# Patient Record
Sex: FEMALE | Race: WHITE | NOT HISPANIC OR LATINO | URBAN - METROPOLITAN AREA
[De-identification: names, ages, dates, MRNs, and addresses within clinical notes are randomized per-mention and may not be internally consistent; named-entity substitution may affect disease eponyms.]

---

## 2020-03-04 ENCOUNTER — EMERGENCY (EMERGENCY)
Facility: HOSPITAL | Age: 85
LOS: 1 days | Discharge: LEFT BEFORE DISCHARGE | End: 2020-03-04
Attending: EMERGENCY MEDICINE
Payer: MEDICARE

## 2020-03-04 VITALS
HEIGHT: 71 IN | OXYGEN SATURATION: 98 % | HEART RATE: 110 BPM | SYSTOLIC BLOOD PRESSURE: 128 MMHG | RESPIRATION RATE: 18 BRPM | TEMPERATURE: 97 F | DIASTOLIC BLOOD PRESSURE: 94 MMHG | WEIGHT: 179.9 LBS

## 2020-03-04 VITALS
TEMPERATURE: 98 F | OXYGEN SATURATION: 98 % | DIASTOLIC BLOOD PRESSURE: 71 MMHG | RESPIRATION RATE: 17 BRPM | HEART RATE: 89 BPM | SYSTOLIC BLOOD PRESSURE: 131 MMHG

## 2020-03-04 DIAGNOSIS — Z90.49 ACQUIRED ABSENCE OF OTHER SPECIFIED PARTS OF DIGESTIVE TRACT: Chronic | ICD-10-CM

## 2020-03-04 LAB
ALBUMIN SERPL ELPH-MCNC: 3.6 G/DL — SIGNIFICANT CHANGE UP (ref 3.3–5.2)
ALP SERPL-CCNC: 67 U/L — SIGNIFICANT CHANGE UP (ref 40–120)
ALT FLD-CCNC: 15 U/L — SIGNIFICANT CHANGE UP
ANION GAP SERPL CALC-SCNC: 14 MMOL/L — SIGNIFICANT CHANGE UP (ref 5–17)
APPEARANCE UR: CLEAR — SIGNIFICANT CHANGE UP
AST SERPL-CCNC: 22 U/L — SIGNIFICANT CHANGE UP
BASOPHILS # BLD AUTO: 0.04 K/UL — SIGNIFICANT CHANGE UP (ref 0–0.2)
BASOPHILS NFR BLD AUTO: 0.6 % — SIGNIFICANT CHANGE UP (ref 0–2)
BILIRUB SERPL-MCNC: 0.4 MG/DL — SIGNIFICANT CHANGE UP (ref 0.4–2)
BILIRUB UR-MCNC: NEGATIVE — SIGNIFICANT CHANGE UP
BUN SERPL-MCNC: 19 MG/DL — SIGNIFICANT CHANGE UP (ref 8–20)
CALCIUM SERPL-MCNC: 8.8 MG/DL — SIGNIFICANT CHANGE UP (ref 8.6–10.2)
CHLORIDE SERPL-SCNC: 97 MMOL/L — LOW (ref 98–107)
CO2 SERPL-SCNC: 28 MMOL/L — SIGNIFICANT CHANGE UP (ref 22–29)
COLOR SPEC: YELLOW — SIGNIFICANT CHANGE UP
CREAT SERPL-MCNC: 0.83 MG/DL — SIGNIFICANT CHANGE UP (ref 0.5–1.3)
DIFF PNL FLD: ABNORMAL
EOSINOPHIL # BLD AUTO: 0.12 K/UL — SIGNIFICANT CHANGE UP (ref 0–0.5)
EOSINOPHIL NFR BLD AUTO: 1.8 % — SIGNIFICANT CHANGE UP (ref 0–6)
EPI CELLS # UR: SIGNIFICANT CHANGE UP
GLUCOSE SERPL-MCNC: 93 MG/DL — SIGNIFICANT CHANGE UP (ref 70–99)
GLUCOSE UR QL: NEGATIVE MG/DL — SIGNIFICANT CHANGE UP
HCT VFR BLD CALC: 45.8 % — HIGH (ref 34.5–45)
HGB BLD-MCNC: 15.2 G/DL — SIGNIFICANT CHANGE UP (ref 11.5–15.5)
IMM GRANULOCYTES NFR BLD AUTO: 0.4 % — SIGNIFICANT CHANGE UP (ref 0–1.5)
KETONES UR-MCNC: ABNORMAL
LEUKOCYTE ESTERASE UR-ACNC: NEGATIVE — SIGNIFICANT CHANGE UP
LIDOCAIN IGE QN: 48 U/L — SIGNIFICANT CHANGE UP (ref 22–51)
LYMPHOCYTES # BLD AUTO: 0.9 K/UL — LOW (ref 1–3.3)
LYMPHOCYTES # BLD AUTO: 13.4 % — SIGNIFICANT CHANGE UP (ref 13–44)
MCHC RBC-ENTMCNC: 29.5 PG — SIGNIFICANT CHANGE UP (ref 27–34)
MCHC RBC-ENTMCNC: 33.2 GM/DL — SIGNIFICANT CHANGE UP (ref 32–36)
MCV RBC AUTO: 88.8 FL — SIGNIFICANT CHANGE UP (ref 80–100)
MONOCYTES # BLD AUTO: 0.73 K/UL — SIGNIFICANT CHANGE UP (ref 0–0.9)
MONOCYTES NFR BLD AUTO: 10.8 % — SIGNIFICANT CHANGE UP (ref 2–14)
NEUTROPHILS # BLD AUTO: 4.91 K/UL — SIGNIFICANT CHANGE UP (ref 1.8–7.4)
NEUTROPHILS NFR BLD AUTO: 73 % — SIGNIFICANT CHANGE UP (ref 43–77)
NITRITE UR-MCNC: NEGATIVE — SIGNIFICANT CHANGE UP
PH UR: 6.5 — SIGNIFICANT CHANGE UP (ref 5–8)
PLATELET # BLD AUTO: 207 K/UL — SIGNIFICANT CHANGE UP (ref 150–400)
POTASSIUM SERPL-MCNC: 3.2 MMOL/L — LOW (ref 3.5–5.3)
POTASSIUM SERPL-SCNC: 3.2 MMOL/L — LOW (ref 3.5–5.3)
PROT SERPL-MCNC: 6.1 G/DL — LOW (ref 6.6–8.7)
PROT UR-MCNC: NEGATIVE MG/DL — SIGNIFICANT CHANGE UP
RBC # BLD: 5.16 M/UL — SIGNIFICANT CHANGE UP (ref 3.8–5.2)
RBC # FLD: 13.2 % — SIGNIFICANT CHANGE UP (ref 10.3–14.5)
RBC CASTS # UR COMP ASSIST: SIGNIFICANT CHANGE UP /HPF (ref 0–4)
SODIUM SERPL-SCNC: 139 MMOL/L — SIGNIFICANT CHANGE UP (ref 135–145)
SP GR SPEC: 1.01 — SIGNIFICANT CHANGE UP (ref 1.01–1.02)
UROBILINOGEN FLD QL: NEGATIVE MG/DL — SIGNIFICANT CHANGE UP
WBC # BLD: 6.73 K/UL — SIGNIFICANT CHANGE UP (ref 3.8–10.5)
WBC # FLD AUTO: 6.73 K/UL — SIGNIFICANT CHANGE UP (ref 3.8–10.5)
WBC UR QL: NEGATIVE — SIGNIFICANT CHANGE UP

## 2020-03-04 PROCEDURE — 81001 URINALYSIS AUTO W/SCOPE: CPT

## 2020-03-04 PROCEDURE — 99284 EMERGENCY DEPT VISIT MOD MDM: CPT | Mod: 25

## 2020-03-04 PROCEDURE — 36415 COLL VENOUS BLD VENIPUNCTURE: CPT

## 2020-03-04 PROCEDURE — 99284 EMERGENCY DEPT VISIT MOD MDM: CPT

## 2020-03-04 PROCEDURE — 74177 CT ABD & PELVIS W/CONTRAST: CPT

## 2020-03-04 PROCEDURE — 83690 ASSAY OF LIPASE: CPT

## 2020-03-04 PROCEDURE — 85027 COMPLETE CBC AUTOMATED: CPT

## 2020-03-04 PROCEDURE — 80053 COMPREHEN METABOLIC PANEL: CPT

## 2020-03-04 PROCEDURE — 74177 CT ABD & PELVIS W/CONTRAST: CPT | Mod: 26

## 2020-03-04 RX ORDER — SODIUM CHLORIDE 9 MG/ML
500 INJECTION INTRAMUSCULAR; INTRAVENOUS; SUBCUTANEOUS ONCE
Refills: 0 | Status: COMPLETED | OUTPATIENT
Start: 2020-03-04 | End: 2020-03-04

## 2020-03-04 RX ORDER — MORPHINE SULFATE 50 MG/1
4 CAPSULE, EXTENDED RELEASE ORAL ONCE
Refills: 0 | Status: DISCONTINUED | OUTPATIENT
Start: 2020-03-04 | End: 2020-03-04

## 2020-03-04 RX ADMIN — SODIUM CHLORIDE 500 MILLILITER(S): 9 INJECTION INTRAMUSCULAR; INTRAVENOUS; SUBCUTANEOUS at 15:45

## 2020-03-04 NOTE — ED PROVIDER NOTE - CLINICAL SUMMARY MEDICAL DECISION MAKING FREE TEXT BOX
lower abdominal pain with vomiting;   rt lower abdominal tenderness;  eval appy, diverticulitis; pt seen by surgery who recommend observation ; pt refusing to be placed on observation; pt to sign out ama; pt will follow up with pmd;

## 2020-03-04 NOTE — ED ADULT NURSE NOTE - CHIEF COMPLAINT QUOTE
c/o abdominal pain lower area, onset 3days ago, + nausea, + vomiting  sent for r/o AP vs diverticulitis/ pancreatitis  A&Ox3, resp wnl, amb, NAD

## 2020-03-04 NOTE — ED PROVIDER NOTE - OBJECTIVE STATEMENT
85 yo female pmh afib on eliquis comes to ed with 7 day history  of lower abdominal pain , decreased appetite , nausea;

## 2020-03-04 NOTE — CONSULT NOTE ADULT - ASSESSMENT
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 Pneumobilia has a broad differential diagnosis, patient does have hx of open cholecystectomy and pancreatitis, less likely secondary to ischemia bowel in the patient with normal labs, and vitally stable.     -recommend ED observation at this time  -surgery will follow for serial abd exams

## 2020-03-04 NOTE — ED ADULT NURSE REASSESSMENT NOTE - NS ED NURSE REASSESS COMMENT FT1
Pt received at 1939. Chart as noted. Pt is A&OX3. Pt denies pain, N/V/D. Pt VSS. Pt in NAD at this time. Pt moves all extremities equal and bilateral. Plan of care and wait time explained. Pt awaiting surgery.  Safety maintained.

## 2020-03-04 NOTE — ED ADULT TRIAGE NOTE - CHIEF COMPLAINT QUOTE
c/o abdominal pain lower area, onset 3days ago, + nausea, + vomiting c/o abdominal pain lower area, onset 3days ago, + nausea, + vomiting  sent for r/o AP vs diverticulitis/ pancreatitis  A&Ox3, resp wnl, amb, NAD

## 2020-03-04 NOTE — ED STATDOCS - PROGRESS NOTE DETAILS
83 y/o F pt with significant PMHx of A-fib, C-diff, and MRSA presents to the ED c/o abdominal pain. Pt states she went to [facility] for 2 days of vomiting and abdominal pain with associated diarrhea on Sunday, and was sent he for evaluation. Pt states that she had 3 episodes of syncope on Saturday. Pt states that she is feeling bloated and pain is only experienced on palpation. She reports that she had similar symptoms in 2011 went to [facility] and was misdiagnosed with a blockage in her intestine and developed an infection post surgery. removed 2 feet of intestine and developed MRSA and C-diff. Transferred to [place]. Tenderness across lower abdomen with guarding. noted to be tachycardic, notes baseline heart rate to be 80's to 90's. will place protocol orders and send to [facility] for further eval with cardiac monitoring

## 2020-03-04 NOTE — ED STATDOCS - CLINICAL SUMMARY MEDICAL DECISION MAKING FREE TEXT BOX
I, Sofía Wu, performed the initial face to face bedside interview with this patient regarding history of present illness and determined that the patient should be seen in the main ED.  The history, was documented by the scribe in my presence and I attest to the accuracy of the documentation.

## 2020-03-04 NOTE — ED PROVIDER NOTE - PATIENT PORTAL LINK FT
You can access the FollowMyHealth Patient Portal offered by [facility] by registering at the following website: [email or web address]. By joining [facility]’s FollowMyHealth portal, you will also be able to view your health information using other applications (apps) compatible with our system.

## 2020-03-04 NOTE — ED STATDOCS - OBJECTIVE STATEMENT
85 y/o F pt with significant PMHx of A-fib, C-diff, and MRSA presents to the ED c/o abdominal pain. Pt states she went to [facility] for 2 days of vomiting and abdominal pain with associated diarrhea on Sunday, and was sent he for evaluation. Pt states that she had 3 episodes of syncope on Saturday. Pt states that she is feeling bloated and pain is only experienced on palpation. She reports that she had similar symptoms in 2011 went to [facility] and was misdiagnosed with a blockage in her intestine and developed an infection post surgery. He removed 2 feet of intestine and developed MRSA and C-diff. Transferred to [place]. Tenderness across lower abdomen with guarding

## 2020-03-04 NOTE — ED ADULT NURSE NOTE - OBJECTIVE STATEMENT
n/v/d and generalized non radiating lower abdominal pain x 5 days. reports history of colon resection due to "misdiagnoses of bowel obstruction". a and o x3. breathing even and unlabored. nsr to sinus tach with hr in low 100's on cm. sitting calm in bed. will continue to monitor.

## 2020-03-04 NOTE — CONSULT NOTE ADULT - SUBJECTIVE AND OBJECTIVE BOX
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 Pain initially started on saturday associated with nausea and vomiting w/syncope on saturday, diarrhea 2 days ago, and now just diffuse abd pain only upon palpation. Patient adamant about refusing surgery due to previous hx of "misdiagnosis". Currently without fevers/chills, no nausea or vomiting.     No recent endoscopy or instrumentation to biliary tree.       PAST MEDICAL HISTORY:  Afib  HLD (hyperlipidemia)  CAD (coronary artery disease)      PAST SURGICAL HISTORY:  S/P colon resection      ALLERGIES:  No Known Allergies        VITALS & I/Os:  Vital Signs Last 24 Hrs  T(C): 36.9 (04 Mar 2020 21:21), Max: 36.9 (04 Mar 2020 21:21)  T(F): 98.5 (04 Mar 2020 21:21), Max: 98.5 (04 Mar 2020 21:21)  HR: 89 (04 Mar 2020 21:21) (89 - 110)  BP: 131/71 (04 Mar 2020 21:21) (128/94 - 131/71)  BP(mean): --  RR: 17 (04 Mar 2020 21:21) (17 - 18)  SpO2: 98% (04 Mar 2020 21:21) (98% - 98%)  CAPILLARY BLOOD GLUCOSE          I&O's Summary      GENERAL: Alert, well developed, in no acute distress.  MENTAL STATUS: AAOx3. Appropriate affect.  HEENT: PERRLA. EOMI.  RESPIRATORY: No increased WOB  CARDIOVASCULAR: +s1/s2  GASTROINTESTINAL: Abdomen soft, diffusely tender, mildly distended, no rebound. Well healed open cholecystectomy incision, midline laparotomy incision and ileostomy scar  NEUROLOGIC: Cranial nerves II-XII grossly intact. No focal neurological deficits. Moves all extremities spontaneously.      LABS:                        15.2   6.73  )-----------( 207      ( 04 Mar 2020 16:14 )             45.8     03-04    139  |  97<L>  |  19.0  ----------------------------<  93  3.2<L>   |  28.0  |  0.83    Ca    8.8      04 Mar 2020 17:33    TPro  6.1<L>  /  Alb  3.6  /  TBili  0.4  /  DBili  x   /  AST  22  /  ALT  15  /  AlkPhos  67  03-04    Lactate:              Urinalysis Basic - ( 04 Mar 2020 17:33 )    Color: Yellow / Appearance: Clear / S.010 / pH: x  Gluc: x / Ketone: Small  / Bili: Negative / Urobili: Negative mg/dL   Blood: x / Protein: Negative mg/dL / Nitrite: Negative   Leuk Esterase: Negative / RBC: 0-2 /HPF / WBC Negative   Sq Epi: x / Non Sq Epi: Occasional / Bacteria: x        IMAGING:

## 2020-03-04 NOTE — ED ADULT NURSE NOTE - NS ED NURSE LEVEL OF CONSCIOUSNESS MENTAL STATUS
On arrival pt is talking, not making sense. Mom states she first saw pt at 1500 today and she was combative and non coherent.
Pt is now awake and talking on the phone.   Repeat blood sugar is 399
Pt up to bathroom without difficulty. Pt states she is feeling better.
Awake/Alert

## 2021-11-16 ENCOUNTER — INPATIENT (INPATIENT)
Facility: HOSPITAL | Age: 86
LOS: 4 days | Discharge: HOME | DRG: 871 | End: 2021-11-21
Attending: SURGERY | Admitting: SURGERY
Payer: MEDICARE

## 2021-11-16 VITALS
HEIGHT: 71 IN | HEART RATE: 120 BPM | DIASTOLIC BLOOD PRESSURE: 56 MMHG | SYSTOLIC BLOOD PRESSURE: 107 MMHG | RESPIRATION RATE: 20 BRPM | TEMPERATURE: 99 F | OXYGEN SATURATION: 96 %

## 2021-11-16 DIAGNOSIS — K56.609 UNSPECIFIED INTESTINAL OBSTRUCTION, UNSPECIFIED AS TO PARTIAL VERSUS COMPLETE OBSTRUCTION: ICD-10-CM

## 2021-11-16 DIAGNOSIS — Z90.49 ACQUIRED ABSENCE OF OTHER SPECIFIED PARTS OF DIGESTIVE TRACT: Chronic | ICD-10-CM

## 2021-11-16 PROBLEM — I25.10 ATHEROSCLEROTIC HEART DISEASE OF NATIVE CORONARY ARTERY WITHOUT ANGINA PECTORIS: Chronic | Status: ACTIVE | Noted: 2020-03-04

## 2021-11-16 PROBLEM — E78.5 HYPERLIPIDEMIA, UNSPECIFIED: Chronic | Status: ACTIVE | Noted: 2020-03-04

## 2021-11-16 PROBLEM — I48.91 UNSPECIFIED ATRIAL FIBRILLATION: Chronic | Status: ACTIVE | Noted: 2020-03-04

## 2021-11-16 LAB
ALBUMIN SERPL ELPH-MCNC: 3.5 G/DL — SIGNIFICANT CHANGE UP (ref 3.3–5.2)
ALBUMIN SERPL ELPH-MCNC: 4.2 G/DL — SIGNIFICANT CHANGE UP (ref 3.3–5.2)
ALP SERPL-CCNC: 101 U/L — SIGNIFICANT CHANGE UP (ref 40–120)
ALP SERPL-CCNC: 90 U/L — SIGNIFICANT CHANGE UP (ref 40–120)
ALT FLD-CCNC: 151 U/L — HIGH
ALT FLD-CCNC: 57 U/L — HIGH
ANION GAP SERPL CALC-SCNC: 19 MMOL/L — HIGH (ref 5–17)
ANION GAP SERPL CALC-SCNC: 22 MMOL/L — HIGH (ref 5–17)
APPEARANCE UR: CLEAR — SIGNIFICANT CHANGE UP
APTT BLD: 23.5 SEC — LOW (ref 27.5–35.5)
AST SERPL-CCNC: 161 U/L — HIGH
AST SERPL-CCNC: 76 U/L — HIGH
BACTERIA # UR AUTO: ABNORMAL
BASOPHILS # BLD AUTO: 0 K/UL — SIGNIFICANT CHANGE UP (ref 0–0.2)
BASOPHILS # BLD AUTO: 0 K/UL — SIGNIFICANT CHANGE UP (ref 0–0.2)
BASOPHILS NFR BLD AUTO: 0 % — SIGNIFICANT CHANGE UP (ref 0–2)
BASOPHILS NFR BLD AUTO: 0 % — SIGNIFICANT CHANGE UP (ref 0–2)
BILIRUB SERPL-MCNC: 0.8 MG/DL — SIGNIFICANT CHANGE UP (ref 0.4–2)
BILIRUB SERPL-MCNC: 1 MG/DL — SIGNIFICANT CHANGE UP (ref 0.4–2)
BILIRUB UR-MCNC: NEGATIVE — SIGNIFICANT CHANGE UP
BUN SERPL-MCNC: 29.6 MG/DL — HIGH (ref 8–20)
BUN SERPL-MCNC: 32.8 MG/DL — HIGH (ref 8–20)
CALCIUM SERPL-MCNC: 8 MG/DL — LOW (ref 8.6–10.2)
CALCIUM SERPL-MCNC: 9.4 MG/DL — SIGNIFICANT CHANGE UP (ref 8.6–10.2)
CHLORIDE SERPL-SCNC: 101 MMOL/L — SIGNIFICANT CHANGE UP (ref 98–107)
CHLORIDE SERPL-SCNC: 98 MMOL/L — SIGNIFICANT CHANGE UP (ref 98–107)
CO2 SERPL-SCNC: 19 MMOL/L — LOW (ref 22–29)
CO2 SERPL-SCNC: 21 MMOL/L — LOW (ref 22–29)
COLOR SPEC: YELLOW — SIGNIFICANT CHANGE UP
CREAT SERPL-MCNC: 1 MG/DL — SIGNIFICANT CHANGE UP (ref 0.5–1.3)
CREAT SERPL-MCNC: 1.33 MG/DL — HIGH (ref 0.5–1.3)
DIFF PNL FLD: ABNORMAL
ELLIPTOCYTES BLD QL SMEAR: SLIGHT — SIGNIFICANT CHANGE UP
EOSINOPHIL # BLD AUTO: 0 K/UL — SIGNIFICANT CHANGE UP (ref 0–0.5)
EOSINOPHIL # BLD AUTO: 0.21 K/UL — SIGNIFICANT CHANGE UP (ref 0–0.5)
EOSINOPHIL NFR BLD AUTO: 0 % — SIGNIFICANT CHANGE UP (ref 0–6)
EOSINOPHIL NFR BLD AUTO: 0.9 % — SIGNIFICANT CHANGE UP (ref 0–6)
EPI CELLS # UR: SIGNIFICANT CHANGE UP
GAS PNL BLDA: SIGNIFICANT CHANGE UP
GIANT PLATELETS BLD QL SMEAR: PRESENT — SIGNIFICANT CHANGE UP
GIANT PLATELETS BLD QL SMEAR: PRESENT — SIGNIFICANT CHANGE UP
GLUCOSE SERPL-MCNC: 143 MG/DL — HIGH (ref 70–99)
GLUCOSE SERPL-MCNC: 148 MG/DL — HIGH (ref 70–99)
GLUCOSE UR QL: NEGATIVE — SIGNIFICANT CHANGE UP
HCT VFR BLD CALC: 41.3 % — SIGNIFICANT CHANGE UP (ref 34.5–45)
HCT VFR BLD CALC: 45.4 % — HIGH (ref 34.5–45)
HGB BLD-MCNC: 13.7 G/DL — SIGNIFICANT CHANGE UP (ref 11.5–15.5)
HGB BLD-MCNC: 14.7 G/DL — SIGNIFICANT CHANGE UP (ref 11.5–15.5)
INR BLD: 1.3 RATIO — HIGH (ref 0.88–1.16)
KETONES UR-MCNC: ABNORMAL
LACTATE BLDV-MCNC: 2.8 MMOL/L — HIGH (ref 0.5–2)
LACTATE BLDV-MCNC: 4.6 MMOL/L — CRITICAL HIGH (ref 0.5–2)
LEUKOCYTE ESTERASE UR-ACNC: ABNORMAL
LYMPHOCYTES # BLD AUTO: 0 % — LOW (ref 13–44)
LYMPHOCYTES # BLD AUTO: 0 K/UL — LOW (ref 1–3.3)
LYMPHOCYTES # BLD AUTO: 0.12 K/UL — LOW (ref 1–3.3)
LYMPHOCYTES # BLD AUTO: 3.4 % — LOW (ref 13–44)
MAGNESIUM SERPL-MCNC: 1.3 MG/DL — LOW (ref 1.6–2.6)
MANUAL SMEAR VERIFICATION: SIGNIFICANT CHANGE UP
MANUAL SMEAR VERIFICATION: SIGNIFICANT CHANGE UP
MCHC RBC-ENTMCNC: 28.1 PG — SIGNIFICANT CHANGE UP (ref 27–34)
MCHC RBC-ENTMCNC: 28.3 PG — SIGNIFICANT CHANGE UP (ref 27–34)
MCHC RBC-ENTMCNC: 32.4 GM/DL — SIGNIFICANT CHANGE UP (ref 32–36)
MCHC RBC-ENTMCNC: 33.2 GM/DL — SIGNIFICANT CHANGE UP (ref 32–36)
MCV RBC AUTO: 84.8 FL — SIGNIFICANT CHANGE UP (ref 80–100)
MCV RBC AUTO: 87.5 FL — SIGNIFICANT CHANGE UP (ref 80–100)
METAMYELOCYTES # FLD: 0.9 % — HIGH (ref 0–0)
METAMYELOCYTES # FLD: 7.8 % — HIGH (ref 0–0)
MONOCYTES # BLD AUTO: 0.03 K/UL — SIGNIFICANT CHANGE UP (ref 0–0.9)
MONOCYTES # BLD AUTO: 0.83 K/UL — SIGNIFICANT CHANGE UP (ref 0–0.9)
MONOCYTES NFR BLD AUTO: 0.9 % — LOW (ref 2–14)
MONOCYTES NFR BLD AUTO: 3.5 % — SIGNIFICANT CHANGE UP (ref 2–14)
MYELOCYTES NFR BLD: 1.7 % — HIGH (ref 0–0)
MYELOCYTES NFR BLD: 1.7 % — HIGH (ref 0–0)
NEUTROPHILS # BLD AUTO: 20.33 K/UL — HIGH (ref 1.8–7.4)
NEUTROPHILS # BLD AUTO: 3.35 K/UL — SIGNIFICANT CHANGE UP (ref 1.8–7.4)
NEUTROPHILS NFR BLD AUTO: 75.7 % — SIGNIFICANT CHANGE UP (ref 43–77)
NEUTROPHILS NFR BLD AUTO: 86.2 % — HIGH (ref 43–77)
NEUTS BAND # BLD: 10.4 % — HIGH (ref 0–8)
NEUTS BAND # BLD: 5.2 % — SIGNIFICANT CHANGE UP (ref 0–8)
NITRITE UR-MCNC: POSITIVE
OVALOCYTES BLD QL SMEAR: SLIGHT — SIGNIFICANT CHANGE UP
PH UR: 6 — SIGNIFICANT CHANGE UP (ref 5–8)
PHOSPHATE SERPL-MCNC: 3.9 MG/DL — SIGNIFICANT CHANGE UP (ref 2.4–4.7)
PLAT MORPH BLD: NORMAL — SIGNIFICANT CHANGE UP
PLAT MORPH BLD: NORMAL — SIGNIFICANT CHANGE UP
PLATELET # BLD AUTO: 187 K/UL — SIGNIFICANT CHANGE UP (ref 150–400)
PLATELET # BLD AUTO: 252 K/UL — SIGNIFICANT CHANGE UP (ref 150–400)
POIKILOCYTOSIS BLD QL AUTO: SLIGHT — SIGNIFICANT CHANGE UP
POTASSIUM SERPL-MCNC: 3.5 MMOL/L — SIGNIFICANT CHANGE UP (ref 3.5–5.3)
POTASSIUM SERPL-MCNC: 3.8 MMOL/L — SIGNIFICANT CHANGE UP (ref 3.5–5.3)
POTASSIUM SERPL-SCNC: 3.5 MMOL/L — SIGNIFICANT CHANGE UP (ref 3.5–5.3)
POTASSIUM SERPL-SCNC: 3.8 MMOL/L — SIGNIFICANT CHANGE UP (ref 3.5–5.3)
PROT SERPL-MCNC: 5.7 G/DL — LOW (ref 6.6–8.7)
PROT SERPL-MCNC: 6.6 G/DL — SIGNIFICANT CHANGE UP (ref 6.6–8.7)
PROT UR-MCNC: 100
PROTHROM AB SERPL-ACNC: 14.9 SEC — HIGH (ref 10.6–13.6)
RAPID RVP RESULT: SIGNIFICANT CHANGE UP
RBC # BLD: 4.87 M/UL — SIGNIFICANT CHANGE UP (ref 3.8–5.2)
RBC # BLD: 5.19 M/UL — SIGNIFICANT CHANGE UP (ref 3.8–5.2)
RBC # FLD: 15.8 % — HIGH (ref 10.3–14.5)
RBC # FLD: 16.4 % — HIGH (ref 10.3–14.5)
RBC BLD AUTO: NORMAL — SIGNIFICANT CHANGE UP
RBC BLD AUTO: SIGNIFICANT CHANGE UP
RBC CASTS # UR COMP ASSIST: SIGNIFICANT CHANGE UP /HPF (ref 0–4)
SARS-COV-2 RNA SPEC QL NAA+PROBE: SIGNIFICANT CHANGE UP
SODIUM SERPL-SCNC: 138 MMOL/L — SIGNIFICANT CHANGE UP (ref 135–145)
SODIUM SERPL-SCNC: 141 MMOL/L — SIGNIFICANT CHANGE UP (ref 135–145)
SP GR SPEC: 1.01 — SIGNIFICANT CHANGE UP (ref 1.01–1.02)
T3 SERPL-MCNC: 40 NG/DL — LOW (ref 80–200)
T4 AB SER-ACNC: 6.3 UG/DL — SIGNIFICANT CHANGE UP (ref 4.5–12)
TROPONIN T SERPL-MCNC: 0.03 NG/ML — SIGNIFICANT CHANGE UP (ref 0–0.06)
TSH SERPL-MCNC: 1.1 UIU/ML — SIGNIFICANT CHANGE UP (ref 0.27–4.2)
UROBILINOGEN FLD QL: NEGATIVE — SIGNIFICANT CHANGE UP
VARIANT LYMPHS # BLD: 1.7 % — SIGNIFICANT CHANGE UP (ref 0–6)
WBC # BLD: 23.61 K/UL — HIGH (ref 3.8–10.5)
WBC # BLD: 3.66 K/UL — LOW (ref 3.8–10.5)
WBC # FLD AUTO: 23.61 K/UL — HIGH (ref 3.8–10.5)
WBC # FLD AUTO: 3.66 K/UL — LOW (ref 3.8–10.5)
WBC UR QL: SIGNIFICANT CHANGE UP

## 2021-11-16 PROCEDURE — 71260 CT THORAX DX C+: CPT | Mod: 26,MB

## 2021-11-16 PROCEDURE — 74174 CTA ABD&PLVS W/CONTRAST: CPT | Mod: 26,ME

## 2021-11-16 PROCEDURE — 99291 CRITICAL CARE FIRST HOUR: CPT

## 2021-11-16 PROCEDURE — 71045 X-RAY EXAM CHEST 1 VIEW: CPT | Mod: 26,77

## 2021-11-16 PROCEDURE — 93010 ELECTROCARDIOGRAM REPORT: CPT

## 2021-11-16 PROCEDURE — 71045 X-RAY EXAM CHEST 1 VIEW: CPT | Mod: 26,76

## 2021-11-16 PROCEDURE — G1004: CPT

## 2021-11-16 PROCEDURE — 93306 TTE W/DOPPLER COMPLETE: CPT | Mod: 26

## 2021-11-16 PROCEDURE — 36556 INSERT NON-TUNNEL CV CATH: CPT

## 2021-11-16 RX ORDER — PANTOPRAZOLE SODIUM 20 MG/1
40 TABLET, DELAYED RELEASE ORAL DAILY
Refills: 0 | Status: DISCONTINUED | OUTPATIENT
Start: 2021-11-16 | End: 2021-11-18

## 2021-11-16 RX ORDER — ACETAMINOPHEN 500 MG
650 TABLET ORAL ONCE
Refills: 0 | Status: COMPLETED | OUTPATIENT
Start: 2021-11-16 | End: 2021-11-16

## 2021-11-16 RX ORDER — PIPERACILLIN AND TAZOBACTAM 4; .5 G/20ML; G/20ML
3.38 INJECTION, POWDER, LYOPHILIZED, FOR SOLUTION INTRAVENOUS ONCE
Refills: 0 | Status: COMPLETED | OUTPATIENT
Start: 2021-11-16 | End: 2021-11-16

## 2021-11-16 RX ORDER — SODIUM CHLORIDE 9 MG/ML
1000 INJECTION INTRAMUSCULAR; INTRAVENOUS; SUBCUTANEOUS ONCE
Refills: 0 | Status: COMPLETED | OUTPATIENT
Start: 2021-11-16 | End: 2021-11-16

## 2021-11-16 RX ORDER — VASOPRESSIN 20 [USP'U]/ML
0.04 INJECTION INTRAVENOUS
Qty: 50 | Refills: 0 | Status: DISCONTINUED | OUTPATIENT
Start: 2021-11-16 | End: 2021-11-18

## 2021-11-16 RX ORDER — ACETAMINOPHEN 500 MG
1000 TABLET ORAL ONCE
Refills: 0 | Status: COMPLETED | OUTPATIENT
Start: 2021-11-16 | End: 2021-11-16

## 2021-11-16 RX ORDER — POTASSIUM CHLORIDE 20 MEQ
20 PACKET (EA) ORAL ONCE
Refills: 0 | Status: COMPLETED | OUTPATIENT
Start: 2021-11-16 | End: 2021-11-17

## 2021-11-16 RX ORDER — SODIUM CHLORIDE 9 MG/ML
1000 INJECTION, SOLUTION INTRAVENOUS ONCE
Refills: 0 | Status: COMPLETED | OUTPATIENT
Start: 2021-11-16 | End: 2021-11-16

## 2021-11-16 RX ORDER — ONDANSETRON 8 MG/1
4 TABLET, FILM COATED ORAL EVERY 6 HOURS
Refills: 0 | Status: DISCONTINUED | OUTPATIENT
Start: 2021-11-16 | End: 2021-11-21

## 2021-11-16 RX ORDER — MAGNESIUM SULFATE 500 MG/ML
4 VIAL (ML) INJECTION ONCE
Refills: 0 | Status: COMPLETED | OUTPATIENT
Start: 2021-11-16 | End: 2021-11-17

## 2021-11-16 RX ORDER — SODIUM CHLORIDE 9 MG/ML
2500 INJECTION INTRAMUSCULAR; INTRAVENOUS; SUBCUTANEOUS ONCE
Refills: 0 | Status: COMPLETED | OUTPATIENT
Start: 2021-11-16 | End: 2021-11-16

## 2021-11-16 RX ORDER — METOPROLOL TARTRATE 50 MG
50 TABLET ORAL ONCE
Refills: 0 | Status: COMPLETED | OUTPATIENT
Start: 2021-11-16 | End: 2021-11-16

## 2021-11-16 RX ORDER — IBUPROFEN 200 MG
600 TABLET ORAL ONCE
Refills: 0 | Status: COMPLETED | OUTPATIENT
Start: 2021-11-16 | End: 2021-11-16

## 2021-11-16 RX ORDER — NOREPINEPHRINE BITARTRATE/D5W 8 MG/250ML
0.05 PLASTIC BAG, INJECTION (ML) INTRAVENOUS
Qty: 8 | Refills: 0 | Status: DISCONTINUED | OUTPATIENT
Start: 2021-11-16 | End: 2021-11-19

## 2021-11-16 RX ORDER — CEFTRIAXONE 500 MG/1
1000 INJECTION, POWDER, FOR SOLUTION INTRAMUSCULAR; INTRAVENOUS ONCE
Refills: 0 | Status: COMPLETED | OUTPATIENT
Start: 2021-11-16 | End: 2021-11-16

## 2021-11-16 RX ORDER — SODIUM CHLORIDE 9 MG/ML
1000 INJECTION, SOLUTION INTRAVENOUS
Refills: 0 | Status: DISCONTINUED | OUTPATIENT
Start: 2021-11-16 | End: 2021-11-18

## 2021-11-16 RX ORDER — CHLORHEXIDINE GLUCONATE 213 G/1000ML
1 SOLUTION TOPICAL DAILY
Refills: 0 | Status: DISCONTINUED | OUTPATIENT
Start: 2021-11-16 | End: 2021-11-19

## 2021-11-16 RX ADMIN — Medication 7.69 MICROGRAM(S)/KG/MIN: at 12:57

## 2021-11-16 RX ADMIN — Medication 1000 MILLIGRAM(S): at 23:51

## 2021-11-16 RX ADMIN — Medication 650 MILLIGRAM(S): at 12:18

## 2021-11-16 RX ADMIN — Medication 600 MILLIGRAM(S): at 12:34

## 2021-11-16 RX ADMIN — CEFTRIAXONE 100 MILLIGRAM(S): 500 INJECTION, POWDER, FOR SOLUTION INTRAMUSCULAR; INTRAVENOUS at 10:06

## 2021-11-16 RX ADMIN — Medication 600 MILLIGRAM(S): at 10:06

## 2021-11-16 RX ADMIN — SODIUM CHLORIDE 120 MILLILITER(S): 9 INJECTION, SOLUTION INTRAVENOUS at 20:49

## 2021-11-16 RX ADMIN — CEFTRIAXONE 1000 MILLIGRAM(S): 500 INJECTION, POWDER, FOR SOLUTION INTRAMUSCULAR; INTRAVENOUS at 10:25

## 2021-11-16 RX ADMIN — VASOPRESSIN 2.4 UNIT(S)/MIN: 20 INJECTION INTRAVENOUS at 22:23

## 2021-11-16 RX ADMIN — SODIUM CHLORIDE 2500 MILLILITER(S): 9 INJECTION INTRAMUSCULAR; INTRAVENOUS; SUBCUTANEOUS at 12:10

## 2021-11-16 RX ADMIN — PIPERACILLIN AND TAZOBACTAM 200 GRAM(S): 4; .5 INJECTION, POWDER, LYOPHILIZED, FOR SOLUTION INTRAVENOUS at 19:15

## 2021-11-16 RX ADMIN — Medication 50 MILLIGRAM(S): at 11:31

## 2021-11-16 RX ADMIN — SODIUM CHLORIDE 2500 MILLILITER(S): 9 INJECTION INTRAMUSCULAR; INTRAVENOUS; SUBCUTANEOUS at 10:06

## 2021-11-16 RX ADMIN — Medication 400 MILLIGRAM(S): at 22:23

## 2021-11-16 RX ADMIN — SODIUM CHLORIDE 2000 MILLILITER(S): 9 INJECTION, SOLUTION INTRAVENOUS at 23:05

## 2021-11-16 RX ADMIN — Medication 650 MILLIGRAM(S): at 12:34

## 2021-11-16 RX ADMIN — SODIUM CHLORIDE 1000 MILLILITER(S): 9 INJECTION INTRAMUSCULAR; INTRAVENOUS; SUBCUTANEOUS at 12:11

## 2021-11-16 NOTE — ED ADULT NURSE REASSESSMENT NOTE - NS ED NURSE REASSESS COMMENT FT1
ER MD at bedside, even and unlabored resps, pt more hypotensive at this time. 3rd L of NS initiated, will take to CT when more stable. urine straight cath'd and sent as ordered. family at bedside.

## 2021-11-16 NOTE — ED ADULT NURSE REASSESSMENT NOTE - NS ED NURSE REASSESS COMMENT FT1
pt with surgical team at bedside for NGT placement. pt remains AOX3, no resp distress, offers no complaints. IV site replaced due to accidental DC. pt with otherwise more stable vitals, levo gtt continues to infuse. awaiting CT results for SICU v MICU

## 2021-11-16 NOTE — ED PROVIDER NOTE - OBJECTIVE STATEMENT
85 y/o F with PMHx afib on Eliquis, CAD, HLD, s/p colon resection who was BIBA c/o abdominal pain. Patient states that she started to experience abdominal pain, fevers, chills, N/V, and diarrhea yesterday evening. She states that the symptoms persisted into today. States that she had a colon resection in the past. States that she takes metoprolol for afib but did not take her morning dose. Denies any other recent illnesses. Unable to provide reliable addition history due to acute distress.

## 2021-11-16 NOTE — ED ADULT NURSE REASSESSMENT NOTE - NS ED NURSE REASSESS COMMENT FT1
pt AOX3, rapid AF on monitor with IVF infusing, pt offering no complaints of pain, SOB or discomforts at this time, pt reports she is just tired. skin warm to touch, no resp distress present, awaiting CT, aware of plan for repeat labs and IVF. will monitor.

## 2021-11-16 NOTE — ED PROVIDER NOTE - CLINICAL SUMMARY MEDICAL DECISION MAKING FREE TEXT BOX
85 y/o F with PMHx afib on Eliquis, CAD, HLD, s/p colon resection who was BIBA c/o abdominal pain. ED sepsis workup, fluids, ceftriaxone, Motrin. CT ab/pel with IV contrast. 85 y/o F with PMHx afib on Eliquis, CAD, HLD, s/p colon resection who was BIBA c/o abdominal pain. ED sepsis workup, fluids, ceftriaxone, Motrin. CT ab/pel with IV contrast. Showing SBO, admit to surgery for further management.

## 2021-11-16 NOTE — PROCEDURE NOTE - NSINDICATIONS_GEN_A_CORE
arterial puncture to obtain ABG's/critical patient/monitoring purposes
emergency venous access/hypertonic/irritant infusion

## 2021-11-16 NOTE — ED ADULT NURSE NOTE - OBJECTIVE STATEMENT
pt alert and oriented x4 comes in c/o rapid afib, with fevers and abdominal pain. RR even labored, diminished RR. MD winters at bedside. code sepsis initiated. pt educated on plan of care, pt able to successfully teach back plan of care to RN, RN will continue to reeducate pt during hospital stay. pt alert and oriented x4 comes in c/o rapid A-fib, with fevers and abdominal pain. RR even labored, diminished RR. MD winters at bedside. code sepsis initiated. pt educated on plan of care, pt able to successfully teach back plan of care to RN, RN will continue to reeducate pt during hospital stay. -- as per family, pt ate soup last night and began vomiting at 1am, which continued in large amounts frequently throughout the morning. pt reports she thought she had food poisoning. pt arrives in rapid AF and febrile. states she does not remember much of the morning or having conversations with her daughter. even and unlabored resps present, pt denies abdominal pain, no chest pain, no SOB, and no recent illnesses. pt able to PICKARD with strength and purpose. pt daughter reports 9 years ago she had an SBO with colon resection with removal of 2 feet of ischemic bowel.

## 2021-11-16 NOTE — ED ADULT TRIAGE NOTE - CHIEF COMPLAINT QUOTE
Patient BIBA to ED today from home with c/o fever, abdominal pains, rapid a-fib, n/v since last night.  Patient brought to critical care and Dr. Medina at bedside.

## 2021-11-16 NOTE — ED PROVIDER NOTE - PROGRESS NOTE DETAILS
: Patient confused and weak, unable to consent for IV contrast. Creatine 1. It is medically necessary to get IV contrast for CT chest/abd/pelvis. : patient more awake, more talkative, but BP 75/50, on 3rd L NS. Afib 100-130s. tyenol 650 mg Po ordered.  Family at bedside, report she had a similar episode many years ago and had bowel necorosis and needed resection. patient kept saying the surgery was a mistake.  report that patient was vomiting all night. peripheral levophed ordered, when pressure more stable, will need CT scan. CT Ab/Pel concerning for SBO, pending official read. Called surgery, will come examine the patient. - Huseyin Vogel PGY1 Followed up with surgery, patient being admitted to SICU under Dr. Turner. Saad Vogel PGY1

## 2021-11-16 NOTE — H&P ADULT - ATTENDING COMMENTS
87 yo F with PMH CAD, HLD, Afib on eliquis, HTN, open ileocolic resection, ileostomy reversal and open benedicto presents with  SBO probably at distal anastomosis.  AAOx3, NAD, reports mild R flank tenderness  PUL CTA  CV IRIR, Tachy  GI R flank mild tenderness, No rebound / guarding, Midline incision and kocker incisions  Lac 6.5--> 4.3  Plan  1. Admit to ICU for resuscitation and CV support with pressors  2. Continue resuscitation with colloid to relay increase in BNP  3. Continue pressors and wean to off as tolerated  4. Place central lines and meliza  5. Place NGT for gastric decompression for 48 hrs and consider gastrograffin challenge  6. Hold eliquis  for possible surgery  7. CHF and hold diuretics until evolumic  8. UTI tx with rocephine      code 00896        CCT 40 min

## 2021-11-16 NOTE — ED PROVIDER NOTE - NS ED ROS FT
Constitutional: + fever, + chills  Head: NC, AT   Eyes: no redness   ENMT: no nasal congestion/drainage, no sore throat   CV: no chest pain, no edema  Resp: no cough, no dyspnea  GI: + abdominal pain, + nausea, + vomiting, + diarrhea  : no dysuria  Skin: no lesions, no rashes   Neuro: no LOC, no headache, no sensory deficits

## 2021-11-16 NOTE — ED ADULT NURSE REASSESSMENT NOTE - NS ED NURSE REASSESS COMMENT FT1
pt accepted to SICU, family aware of plan of care. team here for central line placement, pt offering no complaints, remains in rapid AF on monitor, SICU team aware, no actions taken at this time. A-line to be placed as per SICU as well. pt remains on Levophed gtt @ 0.5 mcg/kg/min at this time with more stable MAP. ramos cath in place, draining dark yellow but clear urine. will continue to monitor.

## 2021-11-16 NOTE — H&P ADULT - HISTORY OF PRESENT ILLNESS
ACUTE CARE SURGERY CONSULT     HPI: 86y Female present to ED with abdominal pain nausea and vomiting which started yesterday after eating tomato soup, this morning felt anxioous and febrile reason why she came. she denies flatus or bowel movements since yesterday pain is diffuse. 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 since then no episodes of SBO, other intraabdominal surgeries open cholecystectomy      ROS: 10-system review is otherwise negative except HPI above.      PAST MEDICAL & SURGICAL HISTORY:  CAD (coronary artery disease)    HLD (hyperlipidemia)    Afib  HTN    S/P colon resection  open cholecystectomy    FAMILY HISTORY:    Family history not pertinent as reviewed with the patient.    SOCIAL HISTORY:  Denies any toxic habits    ALLERGIES: NKA No Known Allergies      HOME MEDICATIONS: ***  Home Medications:  Eliquis:  (04 Mar 2020 16:36)  metoprolol:  (04 Mar 2020 16:36)      --------------------------------------------------------------------------------------------    PHYSICAL EXAM:   General: NAD, Lying in bed comfortably  Neuro: A+Ox3  HEENT: EOMI, PERRLA, MMM  Cardio: RRR  Resp: Non labored breathing on RA  GI/Abd: Soft, distended minimally tender, no rebound/guarding, no masses palpated  Vascular: All 4 extremities warm and well perfused.   Pelvis: stable  Musculoskeletal: All 4 extremities moving spontaneously, no limitations, no spinal tenderness.  --------------------------------------------------------------------------------------------    LABS                 14.7   3.66   )----------(  187       ( 2021 10:16 )               45.4      141    |  98     |  29.6   ----------------------------<  148        ( 2021 10:16 )  3.8     |  21.0   |  1.00     Ca    9.4        ( 2021 10:16 )    TPro  6.6    /  Alb  4.2    /  TBili  1.0    /  DBili  x      /  AST  76     /  ALT  57     /  AlkPhos  101    ( 2021 10:16 )    LIVER FUNCTIONS - ( 2021 10:16 )  Alb: 4.2 g/dL / Pro: 6.6 g/dL / ALK PHOS: 101 U/L / ALT: 57 U/L / AST: 76 U/L / GGT: x           PT/INR -  14.9 sec / 1.30 ratio   ( 2021 10:16 )       PTT -  23.5 sec   ( 2021 10:16 )  CAPILLARY BLOOD GLUCOSE    CARDIAC MARKERS ( 2021 10:16 )  x     / <0.01 ng/mL / x     / x     / x          Urinalysis Basic - ( 2021 12:28 )    Color: Yellow / Appearance: Clear / S.015 / pH: x  Gluc: x / Ketone: Trace  / Bili: Negative / Urobili: Negative   Blood: x / Protein: 100 / Nitrite: Positive   Leuk Esterase: Trace / RBC: 0-2 /HPF / WBC 3-5   Sq Epi: x / Non Sq Epi: Occasional / Bacteria: Occasional        13:04 - VBG - pH:       | pCO2:       | pO2:       | Lactate: 4.60   10:17 - VBG - pH:       | pCO2:       | pO2:       | Lactate: 6.40     --------------------------------------------------------------------------------------------  IMAGING  < from: CT Angio Abdomen and Pelvis w/ IV Cont (16. @ 14:45) >   As on prior CT, enhancing concentric wall thickening and luminal narrowing of the neoterminal ileum extending to the anastomosis with the cecum with associated moderate grade small bowel obstruction. The identified surgery of ileocolic anastomosis with preservation of the ileocecal valve may have been performed for Crohn'sdisease given the Crohn's-like appearance of the neoterminal ileum. Alternatively, the appearance of the neoterminal ileum may be related to chronic reflux ileitis.    < end of copied text >  ***    ASSESSMENT: Patient is a 86y old female with SBO probably at distal anastomosis, difusely distended bowel, obstipation, constipation, however lactate severely elevated and currently on levophed thorugh pIV, NGT placed at bedside, however patient will require significant resucitation, lNGT decompression, and SICU adission given Afib and pressor requirements    PLAN:    - Admit to [facility] ICU under Dr. Turner  - NPO/IVF Albiumin  - DVT ppx  - Central line a line  - ramos for close monitoring  - NGT to LCIS  - strict I/Os

## 2021-11-16 NOTE — ED ADULT NURSE REASSESSMENT NOTE - NS ED NURSE REASSESS COMMENT FT1
Report received from previous RN, charting as noted. Patient A&Ox4, denies any pain or discomfort. Received with NG tube in place to right nares, central line in place to right side neck, saline lock in place to right AC & right wrist, cardiac monitor in place, rapid a-fib,  in progress, 100% w/O2 in place @2 LPM via nasal cannula. Indwelling urethra catheter in place draining dark, melissa urine. Plan of care discussed, all questions answered. Placed in position of comfort.

## 2021-11-16 NOTE — ED PROVIDER NOTE - PHYSICAL EXAMINATION
General: mild distress  Head: NC, AT  EENT: EOMI, no scleral icterus  Cardiac: tachycardic, no apparent murmurs, no lower extremity edema  Respiratory: no respiratory distress   Abdomen: soft, ND, diffusely tender, nonperitonitic  MSK/Vascular: soft compartments, warm extremities  Neuro: AAOx3, sensation to light touch intact  Psych: cooperative

## 2021-11-16 NOTE — ED ADULT NURSE NOTE - NSIMPLEMENTINTERV_GEN_ALL_ED
Implemented All Fall Risk Interventions:  [place] to call system. Call bell, personal items and telephone within reach. Instruct patient to call for assistance. Room bathroom lighting operational. Non-slip footwear when patient is off stretcher. Physically safe environment: no spills, clutter or unnecessary equipment. Stretcher in lowest position, wheels locked, appropriate side rails in place. Provide visual cue, wrist band, yellow gown, etc. Monitor gait and stability. Monitor for mental status changes and reorient to person, place, and time. Review medications for side effects contributing to fall risk. Reinforce activity limits and safety measures with patient and family.

## 2021-11-16 NOTE — ED PROVIDER NOTE - ATTENDING CONTRIBUTION TO CARE
I, Jabier Medina, personally saw the patient with the resident, and completed the key components of the history and physical exam. I then discussed the management plan with the resident.    Upon my evaluation, this patient had a high probability of imminent or life-threatening deterioration due to acute abdomen and septic shock , which required my direct attention, intervention, and personal management.    85 yo F h xof Afib on Eliquist, CAD, HDL, colon resection p/w nausea, vomiting, altered mental status. Patient was found febrile to 101 and afib in 140s-160s. Patient took Tylenol around 7 am. Motrin, IVF, rocephine given. Patient found to have lactate 6. 2.5 L IVF given. Then patient became hypotensive, another 1L given without response. Peripheral levophed started. zosyn added. CT abdomen showed SBO. Surgery consulted, admitted to SICU.     I have personally provided  60_ minutes of critical care time exclusive of time spent on separately billable procedures.  Time includes review of laboratory data, radiology results, discussion with consultants, and monitoring for potential decompensation.  Interventions were performed as documented.

## 2021-11-16 NOTE — CONSULT NOTE ADULT - SUBJECTIVE AND OBJECTIVE BOX
ACUTE CARE SURGERY CONSULT     HPI: 86y Female present to ED with   Patient denies fevers/chills, denies lightheadedness/dizziness, denies SOB/chest pain, denies nausea/vomiting, denies constipation/diarrhea.  ***    ROS: 10-system review is otherwise negative except HPI above.      PAST MEDICAL & SURGICAL HISTORY:  CAD (coronary artery disease)    HLD (hyperlipidemia)    Afib    S/P colon resection      FAMILY HISTORY:    Family history not pertinent as reviewed with the patient.    SOCIAL HISTORY:  Denies any toxic habits    ALLERGIES: NKA No Known Allergies      HOME MEDICATIONS: ***  Home Medications:  Eliquis:  (04 Mar 2020 16:36)  metoprolol:  (04 Mar 2020 16:36)      --------------------------------------------------------------------------------------------    PHYSICAL EXAM:   General: NAD, Lying in bed comfortably  Neuro: A+Ox3  HEENT: EOMI, PERRLA, MMM  Cardio: RRR  Resp: Non labored breathing on RA  GI/Abd: Soft, NT/ND, no rebound/guarding, no masses palpated  Vascular: All 4 extremities warm and well perfused.   Pelvis: stable  Musculoskeletal: All 4 extremities moving spontaneously, no limitations, no spinal tenderness.  --------------------------------------------------------------------------------------------    LABS                 14.7   3.66   )----------(  187       ( 2021 10:16 )               45.4      141    |  98     |  29.6   ----------------------------<  148        ( 2021 10:16 )  3.8     |  21.0   |  1.00     Ca    9.4        ( 2021 10:16 )    TPro  6.6    /  Alb  4.2    /  TBili  1.0    /  DBili  x      /  AST  76     /  ALT  57     /  AlkPhos  101    ( 2021 10:16 )    LIVER FUNCTIONS - ( 2021 10:16 )  Alb: 4.2 g/dL / Pro: 6.6 g/dL / ALK PHOS: 101 U/L / ALT: 57 U/L / AST: 76 U/L / GGT: x           PT/INR -  14.9 sec / 1.30 ratio   ( 2021 10:16 )       PTT -  23.5 sec   ( 2021 10:16 )  CAPILLARY BLOOD GLUCOSE    CARDIAC MARKERS ( 2021 10:16 )  x     / <0.01 ng/mL / x     / x     / x          Urinalysis Basic - ( 2021 12:28 )    Color: Yellow / Appearance: Clear / S.015 / pH: x  Gluc: x / Ketone: Trace  / Bili: Negative / Urobili: Negative   Blood: x / Protein: 100 / Nitrite: Positive   Leuk Esterase: Trace / RBC: 0-2 /HPF / WBC 3-5   Sq Epi: x / Non Sq Epi: Occasional / Bacteria: Occasional        13:04 - VBG - pH:       | pCO2:       | pO2:       | Lactate: 4.60   10:17 - VBG - pH:       | pCO2:       | pO2:       | Lactate: 6.40     --------------------------------------------------------------------------------------------  IMAGING  ***    ASSESSMENT: Patient is a 86y old female with ***    PLAN:    - Admit to [facility] floor/ICU***  - NPO/IVF  - pain control  - DVT ppx  - OOB/ambulate  - strict I/Os  - Plan discussed with Attending,      Consult called at:**  Consult seen at:** ACUTE CARE SURGERY CONSULT     HPI: 86y Female present to ED with abdominal pain nausea and vomiting which started yesterday after eating tomato soup, this morning felt anxioous and febrile reason why she came. she denies flatus or bowel movements since yesterday pain is diffuse. 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 since then no episodes of SBO, other intraabdominal surgeries open cholecystectomy      ROS: 10-system review is otherwise negative except HPI above.      PAST MEDICAL & SURGICAL HISTORY:  CAD (coronary artery disease)    HLD (hyperlipidemia)    Afib  HTN    S/P colon resection  open cholecystectomy    FAMILY HISTORY:    Family history not pertinent as reviewed with the patient.    SOCIAL HISTORY:  Denies any toxic habits    ALLERGIES: NKA No Known Allergies      HOME MEDICATIONS: ***  Home Medications:  Eliquis:  (04 Mar 2020 16:36)  metoprolol:  (04 Mar 2020 16:36)      --------------------------------------------------------------------------------------------    PHYSICAL EXAM:   General: NAD, Lying in bed comfortably  Neuro: A+Ox3  HEENT: EOMI, PERRLA, MMM  Cardio: RRR  Resp: Non labored breathing on RA  GI/Abd: Soft, distended minimally tender, no rebound/guarding, no masses palpated  Vascular: All 4 extremities warm and well perfused.   Pelvis: stable  Musculoskeletal: All 4 extremities moving spontaneously, no limitations, no spinal tenderness.  --------------------------------------------------------------------------------------------    LABS                 14.7   3.66   )----------(  187       ( 2021 10:16 )               45.4      141    |  98     |  29.6   ----------------------------<  148        ( 2021 10:16 )  3.8     |  21.0   |  1.00     Ca    9.4        ( 2021 10:16 )    TPro  6.6    /  Alb  4.2    /  TBili  1.0    /  DBili  x      /  AST  76     /  ALT  57     /  AlkPhos  101    ( 2021 10:16 )    LIVER FUNCTIONS - ( 2021 10:16 )  Alb: 4.2 g/dL / Pro: 6.6 g/dL / ALK PHOS: 101 U/L / ALT: 57 U/L / AST: 76 U/L / GGT: x           PT/INR -  14.9 sec / 1.30 ratio   ( 2021 10:16 )       PTT -  23.5 sec   ( 2021 10:16 )  CAPILLARY BLOOD GLUCOSE    CARDIAC MARKERS ( 2021 10:16 )  x     / <0.01 ng/mL / x     / x     / x          Urinalysis Basic - ( 2021 12:28 )    Color: Yellow / Appearance: Clear / S.015 / pH: x  Gluc: x / Ketone: Trace  / Bili: Negative / Urobili: Negative   Blood: x / Protein: 100 / Nitrite: Positive   Leuk Esterase: Trace / RBC: 0-2 /HPF / WBC 3-5   Sq Epi: x / Non Sq Epi: Occasional / Bacteria: Occasional        13:04 - VBG - pH:       | pCO2:       | pO2:       | Lactate: 4.60   10:17 - VBG - pH:       | pCO2:       | pO2:       | Lactate: 6.40     --------------------------------------------------------------------------------------------  IMAGING  < from: CT Angio Abdomen and Pelvis w/ IV Cont (16. @ 14:45) >   As on prior CT, enhancing concentric wall thickening and luminal narrowing of the neoterminal ileum extending to the anastomosis with the cecum with associated moderate grade small bowel obstruction. The identified surgery of ileocolic anastomosis with preservation of the ileocecal valve may have been performed for Crohn'sdisease given the Crohn's-like appearance of the neoterminal ileum. Alternatively, the appearance of the neoterminal ileum may be related to chronic reflux ileitis.    < end of copied text >  ***    ASSESSMENT: Patient is a 86y old female with SBO probably at distal anastomosis, difusely distended bowel, obstipation, constipation, however lactate severely elevated and currently on levophed thorugh pIV, NGT placed at bedside, however patient will require significant resucitation, lNGT decompression, and SICU adission given Afib and pressor requirements    PLAN:    - Admit to [facility] ICU under Dr. Turner  - NPO/IVF Albiumin  - DVT ppx  - Central line a line  - ramos for close monitoring  - NGT to LCIS  - strict I/Os

## 2021-11-16 NOTE — ED ADULT NURSE REASSESSMENT NOTE - NS ED NURSE REASSESS COMMENT FT1
Received pt from [facility]. Pt has bed assignment in SICU at this time, pending transfer upstairs. Central line in place to R IJ, left radial arterial line, NGT to left nostril. VSS at this time.

## 2021-11-17 LAB
ACANTHOCYTES BLD QL SMEAR: SLIGHT — SIGNIFICANT CHANGE UP
ANION GAP SERPL CALC-SCNC: 12 MMOL/L — SIGNIFICANT CHANGE UP (ref 5–17)
ANION GAP SERPL CALC-SCNC: 17 MMOL/L — SIGNIFICANT CHANGE UP (ref 5–17)
ANISOCYTOSIS BLD QL: SLIGHT — SIGNIFICANT CHANGE UP
APPEARANCE UR: ABNORMAL
BACTERIA # UR AUTO: ABNORMAL
BASOPHILS # BLD AUTO: 0.16 K/UL — SIGNIFICANT CHANGE UP (ref 0–0.2)
BASOPHILS # BLD AUTO: 0.17 K/UL — SIGNIFICANT CHANGE UP (ref 0–0.2)
BASOPHILS NFR BLD AUTO: 0.9 % — SIGNIFICANT CHANGE UP (ref 0–2)
BASOPHILS NFR BLD AUTO: 1.7 % — SIGNIFICANT CHANGE UP (ref 0–2)
BILIRUB UR-MCNC: NEGATIVE — SIGNIFICANT CHANGE UP
BUN SERPL-MCNC: 31.8 MG/DL — HIGH (ref 8–20)
BUN SERPL-MCNC: 32.6 MG/DL — HIGH (ref 8–20)
CALCIUM SERPL-MCNC: 7.7 MG/DL — LOW (ref 8.6–10.2)
CALCIUM SERPL-MCNC: 8.1 MG/DL — LOW (ref 8.6–10.2)
CHLORIDE SERPL-SCNC: 103 MMOL/L — SIGNIFICANT CHANGE UP (ref 98–107)
CHLORIDE SERPL-SCNC: 104 MMOL/L — SIGNIFICANT CHANGE UP (ref 98–107)
CO2 SERPL-SCNC: 19 MMOL/L — LOW (ref 22–29)
CO2 SERPL-SCNC: 22 MMOL/L — SIGNIFICANT CHANGE UP (ref 22–29)
COLOR SPEC: YELLOW — SIGNIFICANT CHANGE UP
COVID-19 NUCLEOCAPSID GAM AB INTERP: NEGATIVE — SIGNIFICANT CHANGE UP
COVID-19 NUCLEOCAPSID TOTAL GAM ANTIBODY RESULT: 0.06 INDEX — SIGNIFICANT CHANGE UP
COVID-19 SPIKE DOMAIN AB INTERP: POSITIVE
COVID-19 SPIKE DOMAIN ANTIBODY RESULT: 66 U/ML — HIGH
CREAT SERPL-MCNC: 0.84 MG/DL — SIGNIFICANT CHANGE UP (ref 0.5–1.3)
CREAT SERPL-MCNC: 1.08 MG/DL — SIGNIFICANT CHANGE UP (ref 0.5–1.3)
CULTURE RESULTS: SIGNIFICANT CHANGE UP
DIFF PNL FLD: ABNORMAL
E COLI DNA BLD POS QL NAA+NON-PROBE: SIGNIFICANT CHANGE UP
EOSINOPHIL # BLD AUTO: 0.32 K/UL — SIGNIFICANT CHANGE UP (ref 0–0.5)
EOSINOPHIL # BLD AUTO: 0.43 K/UL — SIGNIFICANT CHANGE UP (ref 0–0.5)
EOSINOPHIL NFR BLD AUTO: 1.8 % — SIGNIFICANT CHANGE UP (ref 0–6)
EOSINOPHIL NFR BLD AUTO: 4.4 % — SIGNIFICANT CHANGE UP (ref 0–6)
EPI CELLS # UR: SIGNIFICANT CHANGE UP
GIANT PLATELETS BLD QL SMEAR: PRESENT — SIGNIFICANT CHANGE UP
GLUCOSE SERPL-MCNC: 148 MG/DL — HIGH (ref 70–99)
GLUCOSE SERPL-MCNC: 169 MG/DL — HIGH (ref 70–99)
GLUCOSE UR QL: NEGATIVE MG/DL — SIGNIFICANT CHANGE UP
GRAM STN FLD: SIGNIFICANT CHANGE UP
HCT VFR BLD CALC: 35.8 % — SIGNIFICANT CHANGE UP (ref 34.5–45)
HCT VFR BLD CALC: 41.3 % — SIGNIFICANT CHANGE UP (ref 34.5–45)
HGB BLD-MCNC: 11.7 G/DL — SIGNIFICANT CHANGE UP (ref 11.5–15.5)
HGB BLD-MCNC: 13.4 G/DL — SIGNIFICANT CHANGE UP (ref 11.5–15.5)
HYPOSEGMENTATION: PRESENT — SIGNIFICANT CHANGE UP
KETONES UR-MCNC: NEGATIVE — SIGNIFICANT CHANGE UP
LACTATE SERPL-SCNC: 1.5 MMOL/L — SIGNIFICANT CHANGE UP (ref 0.5–2)
LACTATE SERPL-SCNC: 2.5 MMOL/L — HIGH (ref 0.5–2)
LEUKOCYTE ESTERASE UR-ACNC: ABNORMAL
LYMPHOCYTES # BLD AUTO: 0.26 K/UL — LOW (ref 1–3.3)
LYMPHOCYTES # BLD AUTO: 0.62 K/UL — LOW (ref 1–3.3)
LYMPHOCYTES # BLD AUTO: 2.6 % — LOW (ref 13–44)
LYMPHOCYTES # BLD AUTO: 3.5 % — LOW (ref 13–44)
MAGNESIUM SERPL-MCNC: 2.5 MG/DL — SIGNIFICANT CHANGE UP (ref 1.6–2.6)
MAGNESIUM SERPL-MCNC: 2.6 MG/DL — SIGNIFICANT CHANGE UP (ref 1.6–2.6)
MANUAL SMEAR VERIFICATION: SIGNIFICANT CHANGE UP
MANUAL SMEAR VERIFICATION: SIGNIFICANT CHANGE UP
MCHC RBC-ENTMCNC: 28.1 PG — SIGNIFICANT CHANGE UP (ref 27–34)
MCHC RBC-ENTMCNC: 28.3 PG — SIGNIFICANT CHANGE UP (ref 27–34)
MCHC RBC-ENTMCNC: 32.4 GM/DL — SIGNIFICANT CHANGE UP (ref 32–36)
MCHC RBC-ENTMCNC: 32.7 GM/DL — SIGNIFICANT CHANGE UP (ref 32–36)
MCV RBC AUTO: 86.6 FL — SIGNIFICANT CHANGE UP (ref 80–100)
MCV RBC AUTO: 86.7 FL — SIGNIFICANT CHANGE UP (ref 80–100)
METAMYELOCYTES # FLD: 13.3 % — HIGH (ref 0–0)
METHOD TYPE: SIGNIFICANT CHANGE UP
MICROCYTES BLD QL: SLIGHT — SIGNIFICANT CHANGE UP
MONOCYTES # BLD AUTO: 0.43 K/UL — SIGNIFICANT CHANGE UP (ref 0–0.9)
MONOCYTES # BLD AUTO: 0.77 K/UL — SIGNIFICANT CHANGE UP (ref 0–0.9)
MONOCYTES NFR BLD AUTO: 4.4 % — SIGNIFICANT CHANGE UP (ref 2–14)
MONOCYTES NFR BLD AUTO: 4.4 % — SIGNIFICANT CHANGE UP (ref 2–14)
NEUTROPHILS # BLD AUTO: 13.08 K/UL — HIGH (ref 1.8–7.4)
NEUTROPHILS # BLD AUTO: 8.58 K/UL — HIGH (ref 1.8–7.4)
NEUTROPHILS NFR BLD AUTO: 54.8 % — SIGNIFICANT CHANGE UP (ref 43–77)
NEUTROPHILS NFR BLD AUTO: 70.2 % — SIGNIFICANT CHANGE UP (ref 43–77)
NEUTS BAND # BLD: 16.7 % — HIGH (ref 0–8)
NEUTS BAND # BLD: 19.5 % — HIGH (ref 0–8)
NITRITE UR-MCNC: NEGATIVE — SIGNIFICANT CHANGE UP
ORGANISM # SPEC MICROSCOPIC CNT: SIGNIFICANT CHANGE UP
OVALOCYTES BLD QL SMEAR: SLIGHT — SIGNIFICANT CHANGE UP
PH UR: 5 — SIGNIFICANT CHANGE UP (ref 5–8)
PHOSPHATE SERPL-MCNC: 2.4 MG/DL — SIGNIFICANT CHANGE UP (ref 2.4–4.7)
PHOSPHATE SERPL-MCNC: 3.9 MG/DL — SIGNIFICANT CHANGE UP (ref 2.4–4.7)
PLAT MORPH BLD: ABNORMAL
PLAT MORPH BLD: NORMAL — SIGNIFICANT CHANGE UP
PLATELET # BLD AUTO: 160 K/UL — SIGNIFICANT CHANGE UP (ref 150–400)
PLATELET # BLD AUTO: 229 K/UL — SIGNIFICANT CHANGE UP (ref 150–400)
POIKILOCYTOSIS BLD QL AUTO: SLIGHT — SIGNIFICANT CHANGE UP
POLYCHROMASIA BLD QL SMEAR: SLIGHT — SIGNIFICANT CHANGE UP
POTASSIUM SERPL-MCNC: 3.8 MMOL/L — SIGNIFICANT CHANGE UP (ref 3.5–5.3)
POTASSIUM SERPL-MCNC: 4.5 MMOL/L — SIGNIFICANT CHANGE UP (ref 3.5–5.3)
POTASSIUM SERPL-SCNC: 3.8 MMOL/L — SIGNIFICANT CHANGE UP (ref 3.5–5.3)
POTASSIUM SERPL-SCNC: 4.5 MMOL/L — SIGNIFICANT CHANGE UP (ref 3.5–5.3)
PROT UR-MCNC: 30 MG/DL
RBC # BLD: 4.13 M/UL — SIGNIFICANT CHANGE UP (ref 3.8–5.2)
RBC # BLD: 4.77 M/UL — SIGNIFICANT CHANGE UP (ref 3.8–5.2)
RBC # FLD: 16.6 % — HIGH (ref 10.3–14.5)
RBC # FLD: 16.7 % — HIGH (ref 10.3–14.5)
RBC BLD AUTO: ABNORMAL
RBC BLD AUTO: NORMAL — SIGNIFICANT CHANGE UP
RBC CASTS # UR COMP ASSIST: ABNORMAL /HPF (ref 0–4)
SARS-COV-2 IGG+IGM SERPL QL IA: 0.06 INDEX — SIGNIFICANT CHANGE UP
SARS-COV-2 IGG+IGM SERPL QL IA: 66 U/ML — HIGH
SARS-COV-2 IGG+IGM SERPL QL IA: NEGATIVE — SIGNIFICANT CHANGE UP
SARS-COV-2 IGG+IGM SERPL QL IA: POSITIVE
SODIUM SERPL-SCNC: 138 MMOL/L — SIGNIFICANT CHANGE UP (ref 135–145)
SODIUM SERPL-SCNC: 139 MMOL/L — SIGNIFICANT CHANGE UP (ref 135–145)
SP GR SPEC: 1.02 — SIGNIFICANT CHANGE UP (ref 1.01–1.02)
SPECIMEN SOURCE: SIGNIFICANT CHANGE UP
URATE CRY FLD QL MICRO: ABNORMAL
UROBILINOGEN FLD QL: NEGATIVE MG/DL — SIGNIFICANT CHANGE UP
VARIANT LYMPHS # BLD: 1.8 % — SIGNIFICANT CHANGE UP (ref 0–6)
WBC # BLD: 17.6 K/UL — HIGH (ref 3.8–10.5)
WBC # BLD: 9.87 K/UL — SIGNIFICANT CHANGE UP (ref 3.8–10.5)
WBC # FLD AUTO: 17.6 K/UL — HIGH (ref 3.8–10.5)
WBC # FLD AUTO: 9.87 K/UL — SIGNIFICANT CHANGE UP (ref 3.8–10.5)
WBC UR QL: SIGNIFICANT CHANGE UP

## 2021-11-17 PROCEDURE — 99291 CRITICAL CARE FIRST HOUR: CPT

## 2021-11-17 RX ORDER — UBIDECARENONE 100 MG
1 CAPSULE ORAL
Qty: 0 | Refills: 0 | DISCHARGE

## 2021-11-17 RX ORDER — PIPERACILLIN AND TAZOBACTAM 4; .5 G/20ML; G/20ML
3.38 INJECTION, POWDER, LYOPHILIZED, FOR SOLUTION INTRAVENOUS EVERY 8 HOURS
Refills: 0 | Status: DISCONTINUED | OUTPATIENT
Start: 2021-11-17 | End: 2021-11-19

## 2021-11-17 RX ORDER — SODIUM CHLORIDE 9 MG/ML
1000 INJECTION, SOLUTION INTRAVENOUS ONCE
Refills: 0 | Status: COMPLETED | OUTPATIENT
Start: 2021-11-17 | End: 2021-11-17

## 2021-11-17 RX ORDER — LEVOTHYROXINE SODIUM 125 MCG
37.5 TABLET ORAL AT BEDTIME
Refills: 0 | Status: DISCONTINUED | OUTPATIENT
Start: 2021-11-17 | End: 2021-11-18

## 2021-11-17 RX ORDER — CHOLECALCIFEROL (VITAMIN D3) 125 MCG
1 CAPSULE ORAL
Qty: 0 | Refills: 0 | DISCHARGE

## 2021-11-17 RX ORDER — TRIAMTERENE/HYDROCHLOROTHIAZID 75 MG-50MG
1 TABLET ORAL
Qty: 0 | Refills: 0 | DISCHARGE

## 2021-11-17 RX ORDER — PIPERACILLIN AND TAZOBACTAM 4; .5 G/20ML; G/20ML
3.38 INJECTION, POWDER, LYOPHILIZED, FOR SOLUTION INTRAVENOUS ONCE
Refills: 0 | Status: COMPLETED | OUTPATIENT
Start: 2021-11-17 | End: 2021-11-17

## 2021-11-17 RX ORDER — METOPROLOL TARTRATE 50 MG
0 TABLET ORAL
Qty: 0 | Refills: 0 | DISCHARGE

## 2021-11-17 RX ORDER — APIXABAN 2.5 MG/1
0 TABLET, FILM COATED ORAL
Qty: 0 | Refills: 0 | DISCHARGE

## 2021-11-17 RX ORDER — ICOSAPENT ETHYL 500 MG/1
2 CAPSULE, LIQUID FILLED ORAL
Qty: 0 | Refills: 0 | DISCHARGE

## 2021-11-17 RX ORDER — METOPROLOL TARTRATE 50 MG
2.5 TABLET ORAL ONCE
Refills: 0 | Status: COMPLETED | OUTPATIENT
Start: 2021-11-17 | End: 2021-11-17

## 2021-11-17 RX ORDER — METOPROLOL TARTRATE 50 MG
1 TABLET ORAL
Qty: 0 | Refills: 0 | DISCHARGE

## 2021-11-17 RX ORDER — CALCIUM GLUCONATE 100 MG/ML
2 VIAL (ML) INTRAVENOUS ONCE
Refills: 0 | Status: COMPLETED | OUTPATIENT
Start: 2021-11-17 | End: 2021-11-17

## 2021-11-17 RX ORDER — APIXABAN 2.5 MG/1
1 TABLET, FILM COATED ORAL
Qty: 0 | Refills: 0 | DISCHARGE

## 2021-11-17 RX ORDER — HEPARIN SODIUM 5000 [USP'U]/ML
5000 INJECTION INTRAVENOUS; SUBCUTANEOUS EVERY 8 HOURS
Refills: 0 | Status: DISCONTINUED | OUTPATIENT
Start: 2021-11-17 | End: 2021-11-18

## 2021-11-17 RX ORDER — LEVOTHYROXINE SODIUM 125 MCG
1 TABLET ORAL
Qty: 0 | Refills: 0 | DISCHARGE

## 2021-11-17 RX ORDER — ASCORBIC ACID 60 MG
1 TABLET,CHEWABLE ORAL
Qty: 0 | Refills: 0 | DISCHARGE

## 2021-11-17 RX ADMIN — SODIUM CHLORIDE 120 MILLILITER(S): 9 INJECTION, SOLUTION INTRAVENOUS at 22:30

## 2021-11-17 RX ADMIN — PIPERACILLIN AND TAZOBACTAM 200 GRAM(S): 4; .5 INJECTION, POWDER, LYOPHILIZED, FOR SOLUTION INTRAVENOUS at 10:43

## 2021-11-17 RX ADMIN — ONDANSETRON 4 MILLIGRAM(S): 8 TABLET, FILM COATED ORAL at 02:15

## 2021-11-17 RX ADMIN — VASOPRESSIN 2.4 UNIT(S)/MIN: 20 INJECTION INTRAVENOUS at 20:25

## 2021-11-17 RX ADMIN — Medication 2.5 MILLIGRAM(S): at 19:31

## 2021-11-17 RX ADMIN — Medication 200 GRAM(S): at 01:33

## 2021-11-17 RX ADMIN — Medication 37.5 MICROGRAM(S): at 22:29

## 2021-11-17 RX ADMIN — HEPARIN SODIUM 5000 UNIT(S): 5000 INJECTION INTRAVENOUS; SUBCUTANEOUS at 13:17

## 2021-11-17 RX ADMIN — Medication 50 GRAM(S): at 00:47

## 2021-11-17 RX ADMIN — Medication 100 MILLIEQUIVALENT(S): at 02:15

## 2021-11-17 RX ADMIN — Medication 100 MILLIEQUIVALENT(S): at 03:20

## 2021-11-17 RX ADMIN — HEPARIN SODIUM 5000 UNIT(S): 5000 INJECTION INTRAVENOUS; SUBCUTANEOUS at 21:29

## 2021-11-17 RX ADMIN — Medication 7.69 MICROGRAM(S)/KG/MIN: at 10:44

## 2021-11-17 RX ADMIN — PANTOPRAZOLE SODIUM 40 MILLIGRAM(S): 20 TABLET, DELAYED RELEASE ORAL at 11:25

## 2021-11-17 RX ADMIN — PIPERACILLIN AND TAZOBACTAM 25 GRAM(S): 4; .5 INJECTION, POWDER, LYOPHILIZED, FOR SOLUTION INTRAVENOUS at 13:17

## 2021-11-17 RX ADMIN — PIPERACILLIN AND TAZOBACTAM 25 GRAM(S): 4; .5 INJECTION, POWDER, LYOPHILIZED, FOR SOLUTION INTRAVENOUS at 22:30

## 2021-11-17 RX ADMIN — SODIUM CHLORIDE 2000 MILLILITER(S): 9 INJECTION, SOLUTION INTRAVENOUS at 13:17

## 2021-11-17 RX ADMIN — HEPARIN SODIUM 5000 UNIT(S): 5000 INJECTION INTRAVENOUS; SUBCUTANEOUS at 06:37

## 2021-11-17 RX ADMIN — CHLORHEXIDINE GLUCONATE 1 APPLICATION(S): 213 SOLUTION TOPICAL at 11:25

## 2021-11-17 NOTE — PATIENT PROFILE ADULT - FUNCTIONAL SCREEN CURRENT LEVEL: SWALLOWING (IF SCORE 2 OR MORE FOR ANY ITEM, CONSULT REHAB SERVICES), MLM)
assistance prior to getting out of bed during hospitalization      Infection Precautions                                                                                            [x] Patient understands implementation of Surgical Site Infection precautions (see [facility] Presurgical Instructions)     Patient Safety  [x] Patient identification verified  [x] Site verified    Instructions - Discharge Planning for Outpatients  [x] Patient / significant other voices understanding of home care and follow up procedures  [x] Encourage patient / significant other to review discharge instructions the day after procedure due to sedation on day of surgery    Anticipated Special Needs upon discharge:        [] Cooling device        [] Crutches       [] Catherne         [] Wound Support device        [] Drain        [] Other       Instructions - Discharge Planning for Admitted patients  [] Patient / significant other understands plan for admission after surgery  [] Patient / significant other understands plan for anticipated discharge dispostion        12/12/2017 10:59 AM Marcin Sifuentes 2 = difficulty swallowing liquids/foods

## 2021-11-17 NOTE — PATIENT PROFILE ADULT - WILL THE PATIENT ACCEPT THE PFIZER COVID-19 VACCINE IF ELIGIBLE AND IT IS AVAILABLE?
with residual right sided deficit  -cw statin Not applicable -optimize resp status as above, s/p mag sulfate and solumedrol in ED. Now on abx for CAP   -solucortef IVP 40 mg BID   -DuoNebs Q4h  -cw home prn albuterol inhaler, symbicort, and singulair -optimize resp status as above, s/p mag sulfate and solumedrol in ED. Now on abx for CAP   -solumedrol IVP 40 mg BID   -DuoNebs Q4h  -cw home prn albuterol inhaler, symbicort, and singulair

## 2021-11-18 LAB
ANION GAP SERPL CALC-SCNC: 10 MMOL/L — SIGNIFICANT CHANGE UP (ref 5–17)
ANISOCYTOSIS BLD QL: SLIGHT — SIGNIFICANT CHANGE UP
BASOPHILS # BLD AUTO: 0.18 K/UL — SIGNIFICANT CHANGE UP (ref 0–0.2)
BASOPHILS NFR BLD AUTO: 1.8 % — SIGNIFICANT CHANGE UP (ref 0–2)
BUN SERPL-MCNC: 24.9 MG/DL — HIGH (ref 8–20)
CALCIUM SERPL-MCNC: 7.8 MG/DL — LOW (ref 8.6–10.2)
CHLORIDE SERPL-SCNC: 106 MMOL/L — SIGNIFICANT CHANGE UP (ref 98–107)
CO2 SERPL-SCNC: 25 MMOL/L — SIGNIFICANT CHANGE UP (ref 22–29)
CREAT SERPL-MCNC: 0.76 MG/DL — SIGNIFICANT CHANGE UP (ref 0.5–1.3)
EOSINOPHIL # BLD AUTO: 0.77 K/UL — HIGH (ref 0–0.5)
EOSINOPHIL NFR BLD AUTO: 7.8 % — HIGH (ref 0–6)
GIANT PLATELETS BLD QL SMEAR: PRESENT — SIGNIFICANT CHANGE UP
GLUCOSE SERPL-MCNC: 101 MG/DL — HIGH (ref 70–99)
GRAM STN FLD: SIGNIFICANT CHANGE UP
HCT VFR BLD CALC: 34 % — LOW (ref 34.5–45)
HGB BLD-MCNC: 10.9 G/DL — LOW (ref 11.5–15.5)
LYMPHOCYTES # BLD AUTO: 0.34 K/UL — LOW (ref 1–3.3)
LYMPHOCYTES # BLD AUTO: 3.5 % — LOW (ref 13–44)
MAGNESIUM SERPL-MCNC: 2.4 MG/DL — SIGNIFICANT CHANGE UP (ref 1.6–2.6)
MANUAL SMEAR VERIFICATION: SIGNIFICANT CHANGE UP
MCHC RBC-ENTMCNC: 27.7 PG — SIGNIFICANT CHANGE UP (ref 27–34)
MCHC RBC-ENTMCNC: 32.1 GM/DL — SIGNIFICANT CHANGE UP (ref 32–36)
MCV RBC AUTO: 86.5 FL — SIGNIFICANT CHANGE UP (ref 80–100)
METAMYELOCYTES # FLD: 1.7 % — HIGH (ref 0–0)
MICROCYTES BLD QL: SLIGHT — SIGNIFICANT CHANGE UP
MONOCYTES # BLD AUTO: 0.17 K/UL — SIGNIFICANT CHANGE UP (ref 0–0.9)
MONOCYTES NFR BLD AUTO: 1.7 % — LOW (ref 2–14)
NEUTROPHILS # BLD AUTO: 8.2 K/UL — HIGH (ref 1.8–7.4)
NEUTROPHILS NFR BLD AUTO: 66.1 % — SIGNIFICANT CHANGE UP (ref 43–77)
NEUTS BAND # BLD: 17.4 % — HIGH (ref 0–8)
PHOSPHATE SERPL-MCNC: 2 MG/DL — LOW (ref 2.4–4.7)
PLAT MORPH BLD: NORMAL — SIGNIFICANT CHANGE UP
PLATELET # BLD AUTO: 147 K/UL — LOW (ref 150–400)
POIKILOCYTOSIS BLD QL AUTO: SLIGHT — SIGNIFICANT CHANGE UP
POTASSIUM SERPL-MCNC: 3.6 MMOL/L — SIGNIFICANT CHANGE UP (ref 3.5–5.3)
POTASSIUM SERPL-SCNC: 3.6 MMOL/L — SIGNIFICANT CHANGE UP (ref 3.5–5.3)
RBC # BLD: 3.93 M/UL — SIGNIFICANT CHANGE UP (ref 3.8–5.2)
RBC # FLD: 16.7 % — HIGH (ref 10.3–14.5)
RBC BLD AUTO: ABNORMAL
SODIUM SERPL-SCNC: 141 MMOL/L — SIGNIFICANT CHANGE UP (ref 135–145)
WBC # BLD: 9.82 K/UL — SIGNIFICANT CHANGE UP (ref 3.8–10.5)
WBC # FLD AUTO: 9.82 K/UL — SIGNIFICANT CHANGE UP (ref 3.8–10.5)

## 2021-11-18 PROCEDURE — 99291 CRITICAL CARE FIRST HOUR: CPT

## 2021-11-18 RX ORDER — METOPROLOL TARTRATE 50 MG
25 TABLET ORAL
Refills: 0 | Status: DISCONTINUED | OUTPATIENT
Start: 2021-11-18 | End: 2021-11-20

## 2021-11-18 RX ORDER — METOPROLOL TARTRATE 50 MG
2.5 TABLET ORAL EVERY 6 HOURS
Refills: 0 | Status: DISCONTINUED | OUTPATIENT
Start: 2021-11-18 | End: 2021-11-20

## 2021-11-18 RX ORDER — CHOLECALCIFEROL (VITAMIN D3) 125 MCG
1000 CAPSULE ORAL DAILY
Refills: 0 | Status: DISCONTINUED | OUTPATIENT
Start: 2021-11-18 | End: 2021-11-21

## 2021-11-18 RX ORDER — APIXABAN 2.5 MG/1
5 TABLET, FILM COATED ORAL EVERY 12 HOURS
Refills: 0 | Status: DISCONTINUED | OUTPATIENT
Start: 2021-11-18 | End: 2021-11-21

## 2021-11-18 RX ORDER — METOPROLOL TARTRATE 50 MG
2.5 TABLET ORAL ONCE
Refills: 0 | Status: COMPLETED | OUTPATIENT
Start: 2021-11-18 | End: 2021-11-18

## 2021-11-18 RX ORDER — POTASSIUM CHLORIDE 20 MEQ
20 PACKET (EA) ORAL ONCE
Refills: 0 | Status: COMPLETED | OUTPATIENT
Start: 2021-11-18 | End: 2021-11-18

## 2021-11-18 RX ORDER — METOPROLOL TARTRATE 50 MG
2.5 TABLET ORAL EVERY 6 HOURS
Refills: 0 | Status: DISCONTINUED | OUTPATIENT
Start: 2021-11-18 | End: 2021-11-18

## 2021-11-18 RX ORDER — METOPROLOL TARTRATE 50 MG
25 TABLET ORAL
Refills: 0 | Status: DISCONTINUED | OUTPATIENT
Start: 2021-11-18 | End: 2021-11-18

## 2021-11-18 RX ORDER — ASCORBIC ACID 60 MG
500 TABLET,CHEWABLE ORAL DAILY
Refills: 0 | Status: DISCONTINUED | OUTPATIENT
Start: 2021-11-18 | End: 2021-11-21

## 2021-11-18 RX ORDER — LEVOTHYROXINE SODIUM 125 MCG
50 TABLET ORAL DAILY
Refills: 0 | Status: DISCONTINUED | OUTPATIENT
Start: 2021-11-18 | End: 2021-11-21

## 2021-11-18 RX ORDER — POTASSIUM PHOSPHATE, MONOBASIC POTASSIUM PHOSPHATE, DIBASIC 236; 224 MG/ML; MG/ML
15 INJECTION, SOLUTION INTRAVENOUS ONCE
Refills: 0 | Status: COMPLETED | OUTPATIENT
Start: 2021-11-18 | End: 2021-11-18

## 2021-11-18 RX ADMIN — Medication 1 TABLET(S): at 11:00

## 2021-11-18 RX ADMIN — POTASSIUM PHOSPHATE, MONOBASIC POTASSIUM PHOSPHATE, DIBASIC 62.5 MILLIMOLE(S): 236; 224 INJECTION, SOLUTION INTRAVENOUS at 06:17

## 2021-11-18 RX ADMIN — Medication 2.5 MILLIGRAM(S): at 07:29

## 2021-11-18 RX ADMIN — Medication 50 MICROGRAM(S): at 13:40

## 2021-11-18 RX ADMIN — PIPERACILLIN AND TAZOBACTAM 25 GRAM(S): 4; .5 INJECTION, POWDER, LYOPHILIZED, FOR SOLUTION INTRAVENOUS at 14:41

## 2021-11-18 RX ADMIN — Medication 100 MILLIEQUIVALENT(S): at 10:29

## 2021-11-18 RX ADMIN — Medication 7.69 MICROGRAM(S)/KG/MIN: at 05:06

## 2021-11-18 RX ADMIN — SODIUM CHLORIDE 120 MILLILITER(S): 9 INJECTION, SOLUTION INTRAVENOUS at 05:07

## 2021-11-18 RX ADMIN — PIPERACILLIN AND TAZOBACTAM 25 GRAM(S): 4; .5 INJECTION, POWDER, LYOPHILIZED, FOR SOLUTION INTRAVENOUS at 23:13

## 2021-11-18 RX ADMIN — Medication 25 MILLIGRAM(S): at 17:21

## 2021-11-18 RX ADMIN — Medication 25 MILLIGRAM(S): at 10:56

## 2021-11-18 RX ADMIN — HEPARIN SODIUM 5000 UNIT(S): 5000 INJECTION INTRAVENOUS; SUBCUTANEOUS at 05:06

## 2021-11-18 RX ADMIN — Medication 1000 UNIT(S): at 13:40

## 2021-11-18 RX ADMIN — APIXABAN 5 MILLIGRAM(S): 2.5 TABLET, FILM COATED ORAL at 17:21

## 2021-11-18 RX ADMIN — PIPERACILLIN AND TAZOBACTAM 25 GRAM(S): 4; .5 INJECTION, POWDER, LYOPHILIZED, FOR SOLUTION INTRAVENOUS at 05:06

## 2021-11-18 RX ADMIN — CHLORHEXIDINE GLUCONATE 1 APPLICATION(S): 213 SOLUTION TOPICAL at 11:02

## 2021-11-18 RX ADMIN — Medication 500 MILLIGRAM(S): at 11:01

## 2021-11-18 NOTE — DIETITIAN INITIAL EVALUATION ADULT. - ETIOLOGY
related to inability to meet sufficient protein-energy in setting of SBO and hypovolemic/septic shock

## 2021-11-18 NOTE — PHYSICAL THERAPY INITIAL EVALUATION ADULT - PHYSICAL ASSIST/NONPHYSICAL ASSIST: GAIT, REHAB EVAL
RN present t/o PT session to assist as needed with lines/tubes. pt states feeling a little unsteady but feeling stronger the longer she walks/1 person assist

## 2021-11-18 NOTE — PHYSICAL THERAPY INITIAL EVALUATION ADULT - PERTINENT HX OF CURRENT PROBLEM, REHAB EVAL
86 y.o female admitted with SBO, and hypovolemic +/- septic shock likely due to multiple days of nausea and vomiting prior to presenting in ED. Pt admitted to SICU for hemodynamic monitoring and vasopressor requirement.

## 2021-11-18 NOTE — DIETITIAN INITIAL EVALUATION ADULT. - OTHER INFO
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 Admitted with SBO and hypovolemic +/- septic shock. Pt had NGT removed today, diet now advanced to clear. Pt reports she tolerated well with no issues. Denies nausea/vomiting. Pt reports she eats small portions at home, which is her usual; follows a regular diet. Denies any significant weight changes over the last year. RD to follow up.

## 2021-11-18 NOTE — PHYSICAL THERAPY INITIAL EVALUATION ADULT - GENERAL OBSERVATIONS, REHAB EVAL
pt received semi fitzgerald in SICU bed + IV + RIJ triple lumen catheter + L radial A-mirna + ramos + 2L O2 via NC + VCBs + tele/, A&OX4, NAD & willing to participate with PT.

## 2021-11-18 NOTE — DIETITIAN INITIAL EVALUATION ADULT. - IDEAL BODY WEIGHT (LBS)
155 Terbinafine Pregnancy And Lactation Text: This medication is Pregnancy Category B and is considered safe during pregnancy. It is also excreted in breast milk and breast feeding isn't recommended.

## 2021-11-18 NOTE — DIETITIAN INITIAL EVALUATION ADULT. - PERTINENT MEDS FT
MEDICATIONS  (STANDING):  apixaban 5 milliGRAM(s) Oral every 12 hours  ascorbic acid 500 milliGRAM(s) Oral daily  chlorhexidine 2% Cloths 1 Application(s) Topical daily  cholecalciferol 1000 Unit(s) Oral daily  levothyroxine 50 MICROGram(s) Oral daily  metoprolol tartrate 25 milliGRAM(s) Oral two times a day  multivitamin 1 Tablet(s) Oral daily  norepinephrine Infusion 0.05 MICROgram(s)/kG/Min (7.69 mL/Hr) IV Continuous <Continuous>  piperacillin/tazobactam IVPB.. 3.375 Gram(s) IV Intermittent every 8 hours    MEDICATIONS  (PRN):  ondansetron Injectable 4 milliGRAM(s) IV Push every 6 hours PRN Nausea and/or Vomiting

## 2021-11-18 NOTE — PHYSICAL THERAPY INITIAL EVALUATION ADULT - ASSISTIVE DEVICE FOR TRANSFER: GAIT, REHAB EVAL
No AD- pt encouraged to use RW to improve steadiness, pt deferred use of RW at this time. pt may benefit from use of SAC while strength and balance improve.

## 2021-11-18 NOTE — PHYSICAL THERAPY INITIAL EVALUATION ADULT - GAIT DISTANCE, PT EVAL
RN ok'ed pt to amb on RA. pt denies pain, SOB and lightheadedness w/ amb. All vitals stable post PT./100 feet

## 2021-11-18 NOTE — PHYSICAL THERAPY INITIAL EVALUATION ADULT - ADDITIONAL COMMENTS
pt reports she lives with her  in a house with 6 steps to enter from the front with 1 handrail, but able to enter from the garage with 0 steps + 6 steps upstairs to kitchen, bed&bath with handrail +  6 steps downstairs to living area with handrail. pt reports independence with all ADLs, no assistive device needed, driving and going on 30 minute walks w/ spouse. pt owns a shower chair and has grab bars in shower.

## 2021-11-18 NOTE — DIETITIAN INITIAL EVALUATION ADULT. - PERTINENT LABORATORY DATA
11-18 Na141 mmol/L Glu 101 mg/dL<H> K+ 3.6 mmol/L Cr  0.76 mg/dL BUN 24.9 mg/dL<H> Phos 2.0 mg/dL<L> Alb n/a   PAB n/a

## 2021-11-19 LAB
-  AMIKACIN: SIGNIFICANT CHANGE UP
-  AMPICILLIN/SULBACTAM: SIGNIFICANT CHANGE UP
-  AMPICILLIN: SIGNIFICANT CHANGE UP
-  AZTREONAM: SIGNIFICANT CHANGE UP
-  CEFAZOLIN: SIGNIFICANT CHANGE UP
-  CEFEPIME: SIGNIFICANT CHANGE UP
-  CEFOXITIN: SIGNIFICANT CHANGE UP
-  CEFTRIAXONE: SIGNIFICANT CHANGE UP
-  CIPROFLOXACIN: SIGNIFICANT CHANGE UP
-  ERTAPENEM: SIGNIFICANT CHANGE UP
-  GENTAMICIN: SIGNIFICANT CHANGE UP
-  IMIPENEM: SIGNIFICANT CHANGE UP
-  LEVOFLOXACIN: SIGNIFICANT CHANGE UP
-  MEROPENEM: SIGNIFICANT CHANGE UP
-  PIPERACILLIN/TAZOBACTAM: SIGNIFICANT CHANGE UP
-  TOBRAMYCIN: SIGNIFICANT CHANGE UP
-  TRIMETHOPRIM/SULFAMETHOXAZOLE: SIGNIFICANT CHANGE UP
ANION GAP SERPL CALC-SCNC: 7 MMOL/L — SIGNIFICANT CHANGE UP (ref 5–17)
BASOPHILS # BLD AUTO: 0.04 K/UL — SIGNIFICANT CHANGE UP (ref 0–0.2)
BASOPHILS NFR BLD AUTO: 0.5 % — SIGNIFICANT CHANGE UP (ref 0–2)
BUN SERPL-MCNC: 18.3 MG/DL — SIGNIFICANT CHANGE UP (ref 8–20)
CALCIUM SERPL-MCNC: 8.2 MG/DL — LOW (ref 8.6–10.2)
CHLORIDE SERPL-SCNC: 110 MMOL/L — HIGH (ref 98–107)
CO2 SERPL-SCNC: 26 MMOL/L — SIGNIFICANT CHANGE UP (ref 22–29)
CREAT SERPL-MCNC: 0.8 MG/DL — SIGNIFICANT CHANGE UP (ref 0.5–1.3)
CULTURE RESULTS: NO GROWTH — SIGNIFICANT CHANGE UP
CULTURE RESULTS: SIGNIFICANT CHANGE UP
EOSINOPHIL # BLD AUTO: 0.42 K/UL — SIGNIFICANT CHANGE UP (ref 0–0.5)
EOSINOPHIL NFR BLD AUTO: 5 % — SIGNIFICANT CHANGE UP (ref 0–6)
GLUCOSE SERPL-MCNC: 82 MG/DL — SIGNIFICANT CHANGE UP (ref 70–99)
HCT VFR BLD CALC: 32.9 % — LOW (ref 34.5–45)
HGB BLD-MCNC: 10.3 G/DL — LOW (ref 11.5–15.5)
IMM GRANULOCYTES NFR BLD AUTO: 0.6 % — SIGNIFICANT CHANGE UP (ref 0–1.5)
LYMPHOCYTES # BLD AUTO: 0.57 K/UL — LOW (ref 1–3.3)
LYMPHOCYTES # BLD AUTO: 6.8 % — LOW (ref 13–44)
MAGNESIUM SERPL-MCNC: 1.9 MG/DL — SIGNIFICANT CHANGE UP (ref 1.6–2.6)
MCHC RBC-ENTMCNC: 27.5 PG — SIGNIFICANT CHANGE UP (ref 27–34)
MCHC RBC-ENTMCNC: 31.3 GM/DL — LOW (ref 32–36)
MCV RBC AUTO: 88 FL — SIGNIFICANT CHANGE UP (ref 80–100)
METHOD TYPE: SIGNIFICANT CHANGE UP
MONOCYTES # BLD AUTO: 0.46 K/UL — SIGNIFICANT CHANGE UP (ref 0–0.9)
MONOCYTES NFR BLD AUTO: 5.5 % — SIGNIFICANT CHANGE UP (ref 2–14)
NEUTROPHILS # BLD AUTO: 6.79 K/UL — SIGNIFICANT CHANGE UP (ref 1.8–7.4)
NEUTROPHILS NFR BLD AUTO: 81.6 % — HIGH (ref 43–77)
ORGANISM # SPEC MICROSCOPIC CNT: SIGNIFICANT CHANGE UP
PHOSPHATE SERPL-MCNC: 1.8 MG/DL — LOW (ref 2.4–4.7)
PLATELET # BLD AUTO: 137 K/UL — LOW (ref 150–400)
POTASSIUM SERPL-MCNC: 3.9 MMOL/L — SIGNIFICANT CHANGE UP (ref 3.5–5.3)
POTASSIUM SERPL-SCNC: 3.9 MMOL/L — SIGNIFICANT CHANGE UP (ref 3.5–5.3)
RBC # BLD: 3.74 M/UL — LOW (ref 3.8–5.2)
RBC # FLD: 16.6 % — HIGH (ref 10.3–14.5)
SODIUM SERPL-SCNC: 143 MMOL/L — SIGNIFICANT CHANGE UP (ref 135–145)
SPECIMEN SOURCE: SIGNIFICANT CHANGE UP
WBC # BLD: 8.33 K/UL — SIGNIFICANT CHANGE UP (ref 3.8–10.5)
WBC # FLD AUTO: 8.33 K/UL — SIGNIFICANT CHANGE UP (ref 3.8–10.5)

## 2021-11-19 PROCEDURE — 99233 SBSQ HOSP IP/OBS HIGH 50: CPT

## 2021-11-19 RX ORDER — MAGNESIUM SULFATE 500 MG/ML
2 VIAL (ML) INJECTION ONCE
Refills: 0 | Status: COMPLETED | OUTPATIENT
Start: 2021-11-19 | End: 2021-11-19

## 2021-11-19 RX ORDER — POTASSIUM PHOSPHATE, MONOBASIC POTASSIUM PHOSPHATE, DIBASIC 236; 224 MG/ML; MG/ML
15 INJECTION, SOLUTION INTRAVENOUS ONCE
Refills: 0 | Status: COMPLETED | OUTPATIENT
Start: 2021-11-19 | End: 2021-11-19

## 2021-11-19 RX ADMIN — Medication 500 MILLIGRAM(S): at 11:07

## 2021-11-19 RX ADMIN — Medication 25 MILLIGRAM(S): at 06:06

## 2021-11-19 RX ADMIN — Medication 1 TABLET(S): at 18:10

## 2021-11-19 RX ADMIN — Medication 1000 UNIT(S): at 11:06

## 2021-11-19 RX ADMIN — Medication 25 MILLIGRAM(S): at 18:11

## 2021-11-19 RX ADMIN — APIXABAN 5 MILLIGRAM(S): 2.5 TABLET, FILM COATED ORAL at 18:11

## 2021-11-19 RX ADMIN — Medication 50 MICROGRAM(S): at 06:06

## 2021-11-19 RX ADMIN — Medication 50 GRAM(S): at 06:07

## 2021-11-19 RX ADMIN — Medication 1 TABLET(S): at 11:07

## 2021-11-19 RX ADMIN — APIXABAN 5 MILLIGRAM(S): 2.5 TABLET, FILM COATED ORAL at 06:06

## 2021-11-19 RX ADMIN — CHLORHEXIDINE GLUCONATE 1 APPLICATION(S): 213 SOLUTION TOPICAL at 11:18

## 2021-11-19 RX ADMIN — POTASSIUM PHOSPHATE, MONOBASIC POTASSIUM PHOSPHATE, DIBASIC 62.5 MILLIMOLE(S): 236; 224 INJECTION, SOLUTION INTRAVENOUS at 06:06

## 2021-11-19 RX ADMIN — PIPERACILLIN AND TAZOBACTAM 25 GRAM(S): 4; .5 INJECTION, POWDER, LYOPHILIZED, FOR SOLUTION INTRAVENOUS at 06:07

## 2021-11-19 NOTE — CHART NOTE - NSCHARTNOTEFT_GEN_A_CORE
Downgrade Note    Patient downgraded from SICU this late afternoon      Diet, DASH/TLC:   Sodium & Cholesterol Restricted  Supplement Feeding Modality:  Oral  Ensure Enlive Cans or Servings Per Day:  2       Frequency:  Two Times a day (21 @ 11:53)      Scheduled Medications:   amoxicillin  875 milliGRAM(s)/clavulanate 1 Tablet(s) Oral two times a day  apixaban 5 milliGRAM(s) Oral every 12 hours  ascorbic acid 500 milliGRAM(s) Oral daily  cholecalciferol 1000 Unit(s) Oral daily  levothyroxine 50 MICROGram(s) Oral daily  metoprolol tartrate 25 milliGRAM(s) Oral two times a day  multivitamin 1 Tablet(s) Oral daily    PRN Medications:  metoprolol tartrate Injectable 2.5 milliGRAM(s) IV Push every 6 hours PRN HR > 120 bpm  ondansetron Injectable 4 milliGRAM(s) IV Push every 6 hours PRN Nausea and/or Vomiting      Objective:   T(F): 98.4 ( @ 15:51), Max: 98.7 ( @ 23:56)  HR: 109 ( @ 15:51) (103 - 138)  BP: 120/82 ( @ 15:51) (81/65 - 120/82)  BP(mean): 85 ( @ 12:00) (68 - 88)  ABP: 112/61 ( @ 12:00) (84/47 - 137/68)  ABP(mean): 78 ( @ 12:00) (-20 - 105)  RR: 17 ( @ 15:51) (17 - 28)  SpO2: 96% ( @ 15:51) (88% - 96%)      Physical Exam:   GEN: patient resting comfortably in bed, in no acute distress  RESP: respirations are unlabored, no accessory muscle use, no conversational dyspnea  CVS: RRR  GI: abdomen soft, mildly tender to LLQ, non-distended, no rebound tenderness / guarding  MSK: moving all extremities; palpable 2+ DP bilaterally    I&O's     @ 07:01  -   @ 07:00  --------------------------------------------------------  IN:    IV PiggyBack: 50 mL    IV PiggyBack: 300 mL    IV PiggyBack: 250 mL    multiple electrolytes Injection Type 1.: 240 mL    Norepinephrine: 46 mL    Oral Fluid: 60 mL  Total IN: 946 mL    OUT:    Indwelling Catheter - Urethral (mL): 100 mL    Vasopressin: 0 mL    Voided (mL): 575 mL  Total OUT: 675 mL    Total NET: 271 mL          LABS:                        10.3   8.33  )-----------( 137      ( 2021 04:29 )             32.9         143  |  110<H>  |  18.3  ----------------------------<  82  3.9   |  26.0  |  0.80    Ca    8.2<L>      2021 04:29  Phos  1.8       Mg     1.9             Urinalysis Basic - ( 2021 21:10 )    Color: Yellow / Appearance: Slightly Turbid / S.020 / pH: x  Gluc: x / Ketone: Negative  / Bili: Negative / Urobili: Negative mg/dL   Blood: x / Protein: 30 mg/dL / Nitrite: Negative   Leuk Esterase: Small / RBC: 6-10 /HPF / WBC 3-5   Sq Epi: x / Non Sq Epi: Few / Bacteria: Few        MICROBIOLOGY:     Culture - Urine (collected  @ 16:31)  Source: Clean Catch Clean Catch (Midstream)  Final Report ( @ 11:27):    No growth    Culture - Urine (collected  @ 16:54)  Source: Clean Catch Clean Catch (Midstream)  Final Report ( @ 15:31):    <10,000 CFU/mL Normal Urogenital Precious    Culture - Blood (collected  @ 10:26)  Source: .Blood Blood-Peripheral  Gram Stain ( @ 13:49):    Growth in aerobic and anaerobic bottles: Gram Negative Rods    ***Blood Panel PCR results on this specimen are available    approximately 3 hours after the Gram stain result.***    Gram stain, PCR, and/or culture results may not always    correspond due todifference in methodologies.    ************************************************************    This PCR assay was performed using [employer].    The following targets are tested for: Enterococcus,    vancomycin resistant enterococci, Listeria monocytogenes,    coagulase negative staphylococci, S. aureus,    methicillin resistant S. aureus, Streptococcus agalactiae    (Group B), S. pneumoniae, S. pyogenes (Group A),    Acinetobacter baumannii, Enterobacter cloacae, E. coli,    Klebsiella oxytoca, K. pneumoniae, Proteus sp.,    Serratia marcescens, Haemophilus influenzae,    Neisseria meningitidis, Pseudomonas aeruginosa, Candida    albicans, C. glabrata, C krusei, C parapsilosis,    C. tropicalis and the KPC resistance gene.    "Due to technical problems, Pseudomonas aeruginosa    until further notice".    Gram Stain and BCID performed by:    [facility]    [address] [place] [address]    .    TYPE: (C=Critical, N=Notification, A=Abnormal) C    TESTS:  _ GS    DATE/TIME CALLED: _ 2021 08:33:45    CALLED TO: Sonny Jacobs RN    READ BACK (2 Patient Identifiers)(Y/N): _ Y    READ BACK VALUES (Y/N): _ Y    CALLED BY: Sonny Yeun  Final Report ( @ 11:44):    Growth in aerobic and anaerobic bottles: Escherichia coli  Organism: Blood Culture PCR  Escherichia coli ( @ 11:44)  Organism: Escherichia coli ( @ 11:44)      -  Amikacin: S <=16      -  Ampicillin: S <=8 These ampicillin results predict results for amoxicillin      -  Ampicillin/Sulbactam: S <=4/2 Enterobacter, Klebsiella aerogenes, Citrobacter, and Serratia may develop resistance during prolonged therapy (3-4 days)      -  Aztreonam: S <=4      -  Cefazolin: S <=2 Enterobacter, Klebsiella aerogenes, Citrobacter, and Serratia may develop resistance during prolonged therapy (3-4 days)      -  Cefepime: S <=2      -  Cefoxitin: S <=8      -  Ceftriaxone: S <=1 Enterobacter, Klebsiella aerogenes, Citrobacter, and Serratia may develop resistance during prolonged therapy      -  Ciprofloxacin: S <=0.25      -  Ertapenem: S <=0.5      -  Gentamicin: S <=2      -  Imipenem: S <=1      -  Levofloxacin: S <=0.5      -  Meropenem: S <=1      -  Piperacillin/Tazobactam: S <=8      -  Tobramycin: S <=2      -  Trimethoprim/Sulfamethoxazole: S <=0.5/9.5      Method Type: RANGEL  Organism: Blood Culture PCR ( @ 11:44)      -  Escherichia coli: Detec      Method Type: PCR    Culture - Blood (collected  @ 10:23)  Source: .Blood Blood-Peripheral  Gram Stain ( @ 10:55):    Growth in aerobic and anaerobic bottles: Gram Negative Rods    Gram Stain performed by:    [facility]    [address] [place] [id number]    .    TYPE: (C=Critical, N=Notification, A=Abnormal) C    TESTS:  _     DATE/TIME CALLED: _ 2021 08:32:00    CALLED TO: Sonny Jacobs RN    READ BACK (2 Patient Identifiers)(Y/N): _ Y    READ BACK VALUES (Y/N): _ Y    CALLED BY: Sonny Yuen  Final Report ( @ 17:43):    Growth in aerobic and anaerobic bottles: Escherichia coli    See previous culture [id number]        PATHOLOGY:      Assessment and Plan:  86 y.o female admitted with SBO, and hypovolemic + septic shock 2/2 E coli bacteremia.     Neuro:   - frequently assess pain  - pain has been adequately controlled with Tylenol  - regular sleep wake cycle  - delirium precautions    CV:   - weaned off levophed  afternoon  - lactate remains clear  - Pt takes 100 mg metoprolol at home daily, slowly increase dose as tolerated back up to home dose. Currently on 25 mg BID with PRN 2.5 for HR > 120  - Remains in YVETTE, though with better rate control on beta blockers    Pulm:   - NC as needed to maintain Spo2 >90%  - incentive spirometry 10x/hr while awake  - pulm toilet  - encourage coughing and deep breathing  - OOB to chair during the day    GI/Nutrition:   - tolerated CLD, advanced to DASH diet for dinner. Will follow-up on tolerance.   - continues to pass flatus and + BM (last BM was in the AM on )    /Renal:   - Lombardi catheter d/c, passed TOV  - replete lytes as indicated  - CARLA resolved    ID:   - S/p zosyn for E. coli bacteremia; NOW on PO Augmentin  - follow up sensitivies, de-escalate abx as able  - f/u repeat BCX (sent )   - repeat UA negative, UCX negative. Source still presumed to be urinary tract. UCX negative and repeat UA likely cleared after receiving ABX in ER.     Skin:   - Repositioning for DTI prevention while in bed    Heme/DVT Prophylaxis:   - SCDs  - Eliquis

## 2021-11-20 LAB
ANION GAP SERPL CALC-SCNC: 11 MMOL/L — SIGNIFICANT CHANGE UP (ref 5–17)
BASOPHILS # BLD AUTO: 0.05 K/UL — SIGNIFICANT CHANGE UP (ref 0–0.2)
BASOPHILS NFR BLD AUTO: 0.8 % — SIGNIFICANT CHANGE UP (ref 0–2)
BUN SERPL-MCNC: 17 MG/DL — SIGNIFICANT CHANGE UP (ref 8–20)
CALCIUM SERPL-MCNC: 8.1 MG/DL — LOW (ref 8.6–10.2)
CHLORIDE SERPL-SCNC: 108 MMOL/L — HIGH (ref 98–107)
CO2 SERPL-SCNC: 25 MMOL/L — SIGNIFICANT CHANGE UP (ref 22–29)
CREAT SERPL-MCNC: 0.67 MG/DL — SIGNIFICANT CHANGE UP (ref 0.5–1.3)
EOSINOPHIL # BLD AUTO: 0.33 K/UL — SIGNIFICANT CHANGE UP (ref 0–0.5)
EOSINOPHIL NFR BLD AUTO: 5.1 % — SIGNIFICANT CHANGE UP (ref 0–6)
GLUCOSE SERPL-MCNC: 83 MG/DL — SIGNIFICANT CHANGE UP (ref 70–99)
HCT VFR BLD CALC: 33.1 % — LOW (ref 34.5–45)
HGB BLD-MCNC: 10.3 G/DL — LOW (ref 11.5–15.5)
IMM GRANULOCYTES NFR BLD AUTO: 1.4 % — SIGNIFICANT CHANGE UP (ref 0–1.5)
LYMPHOCYTES # BLD AUTO: 0.7 K/UL — LOW (ref 1–3.3)
LYMPHOCYTES # BLD AUTO: 10.7 % — LOW (ref 13–44)
MAGNESIUM SERPL-MCNC: 1.9 MG/DL — SIGNIFICANT CHANGE UP (ref 1.6–2.6)
MCHC RBC-ENTMCNC: 27.2 PG — SIGNIFICANT CHANGE UP (ref 27–34)
MCHC RBC-ENTMCNC: 31.1 GM/DL — LOW (ref 32–36)
MCV RBC AUTO: 87.3 FL — SIGNIFICANT CHANGE UP (ref 80–100)
MONOCYTES # BLD AUTO: 0.48 K/UL — SIGNIFICANT CHANGE UP (ref 0–0.9)
MONOCYTES NFR BLD AUTO: 7.4 % — SIGNIFICANT CHANGE UP (ref 2–14)
NEUTROPHILS # BLD AUTO: 4.87 K/UL — SIGNIFICANT CHANGE UP (ref 1.8–7.4)
NEUTROPHILS NFR BLD AUTO: 74.6 % — SIGNIFICANT CHANGE UP (ref 43–77)
PHOSPHATE SERPL-MCNC: 3.2 MG/DL — SIGNIFICANT CHANGE UP (ref 2.4–4.7)
PLATELET # BLD AUTO: 136 K/UL — LOW (ref 150–400)
POTASSIUM SERPL-MCNC: 4.1 MMOL/L — SIGNIFICANT CHANGE UP (ref 3.5–5.3)
POTASSIUM SERPL-SCNC: 4.1 MMOL/L — SIGNIFICANT CHANGE UP (ref 3.5–5.3)
RBC # BLD: 3.79 M/UL — LOW (ref 3.8–5.2)
RBC # FLD: 16.6 % — HIGH (ref 10.3–14.5)
SODIUM SERPL-SCNC: 144 MMOL/L — SIGNIFICANT CHANGE UP (ref 135–145)
WBC # BLD: 6.52 K/UL — SIGNIFICANT CHANGE UP (ref 3.8–10.5)
WBC # FLD AUTO: 6.52 K/UL — SIGNIFICANT CHANGE UP (ref 3.8–10.5)

## 2021-11-20 PROCEDURE — 99231 SBSQ HOSP IP/OBS SF/LOW 25: CPT | Mod: GC

## 2021-11-20 RX ORDER — METOPROLOL TARTRATE 50 MG
100 TABLET ORAL DAILY
Refills: 0 | Status: DISCONTINUED | OUTPATIENT
Start: 2021-11-21 | End: 2021-11-21

## 2021-11-20 RX ORDER — METOPROLOL TARTRATE 50 MG
50 TABLET ORAL AT BEDTIME
Refills: 0 | Status: COMPLETED | OUTPATIENT
Start: 2021-11-20 | End: 2021-11-20

## 2021-11-20 RX ORDER — METOPROLOL TARTRATE 50 MG
100 TABLET ORAL DAILY
Refills: 0 | Status: DISCONTINUED | OUTPATIENT
Start: 2021-11-20 | End: 2021-11-20

## 2021-11-20 RX ADMIN — Medication 500 MILLIGRAM(S): at 11:24

## 2021-11-20 RX ADMIN — Medication 1 TABLET(S): at 11:24

## 2021-11-20 RX ADMIN — Medication 1 TABLET(S): at 17:21

## 2021-11-20 RX ADMIN — Medication 50 MILLIGRAM(S): at 21:45

## 2021-11-20 RX ADMIN — APIXABAN 5 MILLIGRAM(S): 2.5 TABLET, FILM COATED ORAL at 17:21

## 2021-11-20 RX ADMIN — Medication 1000 UNIT(S): at 11:25

## 2021-11-20 RX ADMIN — APIXABAN 5 MILLIGRAM(S): 2.5 TABLET, FILM COATED ORAL at 05:25

## 2021-11-20 RX ADMIN — Medication 1 TABLET(S): at 05:26

## 2021-11-20 RX ADMIN — Medication 50 MICROGRAM(S): at 05:26

## 2021-11-20 RX ADMIN — Medication 25 MILLIGRAM(S): at 05:26

## 2021-11-20 NOTE — PROGRESS NOTE ADULT - ASSESSMENT
Assessment and Plan:  86 y.o female admitted with SBO, and hypovolemic + septic shock 2/2 E coli bacteremia.     Neuro:   - frequently assess pain  - pain has been adequately controlled with Tylenol  - regular sleep wake cycle  - delirium precautions    CV:   - weaned off levophed 11/18 afternoon  - lactate remains clear  - Pt takes 100 mg metoprolol at home daily, slowly increase dose as tolerated back up to home dose. Currently on 25 mg BID with PRN 2.5 for HR > 120  - Remains in YVETTE, though with better rate control on beta blockers    Pulm:   - NC as needed to maintain Spo2 >90%  - incentive spirometry 10x/hr while awake  - pulm toilet  - encourage coughing and deep breathing  - OOB to chair during the day    GI/Nutrition:   - tolerated CLD, advanced to DASH diet for dinner. Will follow-up on tolerance.   - continues to pass flatus and + BM (last BM was in the AM on 11/19)    /Renal:   - Lombardi catheter d/c, passed TOV  - replete lytes as indicated  - CARLA resolved    ID:   - S/p zosyn for E. coli bacteremia; NOW on PO Augmentin  - follow up sensitivies, de-escalate abx as able  - f/u repeat BCX (sent 11/18)   - repeat UA negative, UCX negative. Source still presumed to be urinary tract. UCX negative and repeat UA likely cleared after receiving ABX in ER.     Skin:   - Repositioning for DTI prevention while in bed    Heme/DVT Prophylaxis:   - SCDs  - Eliquis.

## 2021-11-21 ENCOUNTER — TRANSCRIPTION ENCOUNTER (OUTPATIENT)
Age: 86
End: 2021-11-21

## 2021-11-21 VITALS
SYSTOLIC BLOOD PRESSURE: 135 MMHG | HEART RATE: 104 BPM | OXYGEN SATURATION: 96 % | RESPIRATION RATE: 18 BRPM | TEMPERATURE: 99 F | DIASTOLIC BLOOD PRESSURE: 87 MMHG

## 2021-11-21 PROCEDURE — 85610 PROTHROMBIN TIME: CPT

## 2021-11-21 PROCEDURE — 97163 PT EVAL HIGH COMPLEX 45 MIN: CPT

## 2021-11-21 PROCEDURE — 83735 ASSAY OF MAGNESIUM: CPT

## 2021-11-21 PROCEDURE — 96375 TX/PRO/DX INJ NEW DRUG ADDON: CPT

## 2021-11-21 PROCEDURE — 97530 THERAPEUTIC ACTIVITIES: CPT

## 2021-11-21 PROCEDURE — 82330 ASSAY OF CALCIUM: CPT

## 2021-11-21 PROCEDURE — 85730 THROMBOPLASTIN TIME PARTIAL: CPT

## 2021-11-21 PROCEDURE — 82947 ASSAY GLUCOSE BLOOD QUANT: CPT

## 2021-11-21 PROCEDURE — 97116 GAIT TRAINING THERAPY: CPT

## 2021-11-21 PROCEDURE — 85018 HEMOGLOBIN: CPT

## 2021-11-21 PROCEDURE — 82803 BLOOD GASES ANY COMBINATION: CPT

## 2021-11-21 PROCEDURE — 83880 ASSAY OF NATRIURETIC PEPTIDE: CPT

## 2021-11-21 PROCEDURE — G1004: CPT

## 2021-11-21 PROCEDURE — 87150 DNA/RNA AMPLIFIED PROBE: CPT

## 2021-11-21 PROCEDURE — 36415 COLL VENOUS BLD VENIPUNCTURE: CPT

## 2021-11-21 PROCEDURE — 87040 BLOOD CULTURE FOR BACTERIA: CPT

## 2021-11-21 PROCEDURE — 93308 TTE F-UP OR LMTD: CPT

## 2021-11-21 PROCEDURE — 81001 URINALYSIS AUTO W/SCOPE: CPT

## 2021-11-21 PROCEDURE — 80048 BASIC METABOLIC PNL TOTAL CA: CPT

## 2021-11-21 PROCEDURE — 71260 CT THORAX DX C+: CPT | Mod: MB

## 2021-11-21 PROCEDURE — 87086 URINE CULTURE/COLONY COUNT: CPT

## 2021-11-21 PROCEDURE — 83605 ASSAY OF LACTIC ACID: CPT

## 2021-11-21 PROCEDURE — 84484 ASSAY OF TROPONIN QUANT: CPT

## 2021-11-21 PROCEDURE — 87186 SC STD MICRODIL/AGAR DIL: CPT

## 2021-11-21 PROCEDURE — 74174 CTA ABD&PLVS W/CONTRAST: CPT | Mod: ME

## 2021-11-21 PROCEDURE — 82435 ASSAY OF BLOOD CHLORIDE: CPT

## 2021-11-21 PROCEDURE — 86769 SARS-COV-2 COVID-19 ANTIBODY: CPT

## 2021-11-21 PROCEDURE — 97110 THERAPEUTIC EXERCISES: CPT

## 2021-11-21 PROCEDURE — 96374 THER/PROPH/DIAG INJ IV PUSH: CPT

## 2021-11-21 PROCEDURE — 84436 ASSAY OF TOTAL THYROXINE: CPT

## 2021-11-21 PROCEDURE — 85025 COMPLETE CBC W/AUTO DIFF WBC: CPT

## 2021-11-21 PROCEDURE — 84480 ASSAY TRIIODOTHYRONINE (T3): CPT

## 2021-11-21 PROCEDURE — 71045 X-RAY EXAM CHEST 1 VIEW: CPT

## 2021-11-21 PROCEDURE — 99238 HOSP IP/OBS DSCHRG MGMT 30/<: CPT

## 2021-11-21 PROCEDURE — 84443 ASSAY THYROID STIM HORMONE: CPT

## 2021-11-21 PROCEDURE — 93005 ELECTROCARDIOGRAM TRACING: CPT

## 2021-11-21 PROCEDURE — 99285 EMERGENCY DEPT VISIT HI MDM: CPT

## 2021-11-21 PROCEDURE — 85014 HEMATOCRIT: CPT

## 2021-11-21 PROCEDURE — 84100 ASSAY OF PHOSPHORUS: CPT

## 2021-11-21 PROCEDURE — 84132 ASSAY OF SERUM POTASSIUM: CPT

## 2021-11-21 PROCEDURE — 84295 ASSAY OF SERUM SODIUM: CPT

## 2021-11-21 PROCEDURE — 0225U NFCT DS DNA&RNA 21 SARSCOV2: CPT

## 2021-11-21 PROCEDURE — 80053 COMPREHEN METABOLIC PANEL: CPT

## 2021-11-21 RX ADMIN — Medication 500 MILLIGRAM(S): at 11:25

## 2021-11-21 RX ADMIN — Medication 50 MICROGRAM(S): at 05:51

## 2021-11-21 RX ADMIN — Medication 100 MILLIGRAM(S): at 05:50

## 2021-11-21 RX ADMIN — APIXABAN 5 MILLIGRAM(S): 2.5 TABLET, FILM COATED ORAL at 05:51

## 2021-11-21 RX ADMIN — Medication 1000 UNIT(S): at 11:24

## 2021-11-21 RX ADMIN — Medication 1 TABLET(S): at 05:51

## 2021-11-21 RX ADMIN — Medication 1 TABLET(S): at 11:24

## 2021-11-21 NOTE — DISCHARGE NOTE PROVIDER - NSDCMRMEDTOKEN_GEN_ALL_CORE_FT
Coenzyme Q10 10 mg oral capsule: 1 cap(s) orally once a day  Eliquis 5 mg oral tablet: 1 tab(s) orally 2 times a day  Metoprolol Succinate  mg oral tablet, extended release: 1 tab(s) orally once a day  Multiple Vitamins oral tablet: 1 tab(s) orally once a day  Synthroid 50 mcg (0.05 mg) oral tablet: 1 tab(s) orally once a day  triamterene-hydrochlorothiazide 37.5 mg-25 mg oral tablet: 1 tab(s) orally once a day  Vascepa 1 g oral capsule: 2 cap(s) orally 2 times a day  Vitamin C: 1 tab(s) orally once a day  Vitamin D3: 1 tab(s) orally once a day

## 2021-11-21 NOTE — DISCHARGE NOTE NURSING/CASE MANAGEMENT/SOCIAL WORK - PATIENT PORTAL LINK FT
You can access the FollowMyHealth Patient Portal offered by [facility] by registering at the following website: [email or web address]. By joining [email or web address]’s FollowMyHealth portal, you will also be able to view your health information using other applications (apps) compatible with our system.

## 2021-11-21 NOTE — DISCHARGE NOTE PROVIDER - HOSPITAL COURSE
86y Female present to ED with abdominal pain nausea and vomiting which started yesterday after eating tomato soup, this morning felt anxious and febrile reason why she came. she denies flatus or bowel movements since yesterday pain is diffuse. 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 CT findings consistent with SBO, also found to be in afib, she was admitted to the surgery service to the ICU for resuscitation.  A NGT was placed for bowel decompression. Rest of course was uneventful, she started to have bowel function, the NGT was clamped and removed after she passed a clamp trial. She was adequately resuscitated in ICU. She was downgraded from ICU to floor. She was found to have ecoli bacteremia, was treated with IV antibiotics then switched to oral. Blood cultures cleared. Source of bacteremia likely from SBO which resolved with non op management. She remained on low dose metoprolol (home med) while in the hospital, as she was hypotensive during the beginning of her visit, but her home dose of metoprolol was weaned up. Upon discharge, pain well controlled, tolerating diet, ambulating and voiding independently, Stable for discharge home per attending with outpatient follow up.     length discharge > 30 mins
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

## 2021-11-21 NOTE — PROGRESS NOTE ADULT - ASSESSMENT
Assessment and Plan:  86 y.o female admitted with SBO, and hypovolemic + septic shock 2/2 E coli bacteremia. now opened up, doing well  reg  oob  ambulate  f/u cultures

## 2021-11-21 NOTE — PROGRESS NOTE ADULT - ATTENDING COMMENTS
Patient seen and examined on am rounds No complaints currently, had BM this am and is passing flatus. + blood cultures with gram neg rods. Starting antibiotics, cont pressors, wean as tolerated. recheck lactate. monitor in ICU. up OOB today. monitor NG output, consider clamp trial.
Patient seen and examined on am rounds. NG removed, will start clears. Resume home metoprolol at 25mg BID for now. Wean pressor. PT/OT. Remain in ICU. Remove ramos
Patient seen this am. Adv diet to reg. Continue low dose of metoprolol and convert antibiotics to oral. Stable for transfer out of ICU. PT to work with pt.
Agree with above assessment.  The patient was seen and examined by me.  The patient is with no abdominal pain, nausea, or vomit.  She has been tolerating PO.  Repeat blood cultures resulted negative. Patient is stable for discharge home.
Agree with above assessment. The patient was seen and examined by me.  The patient is without abdominal pain, nausea, or vomit.  The patient on exam is non tender, no guarding, no rebound, tolerating PO.  Awaiting repeat cultures to determine need for antibiotics. Surgically stable.

## 2021-11-21 NOTE — DISCHARGE NOTE PROVIDER - NSDCCPCAREPLAN_GEN_ALL_CORE_FT
PRINCIPAL DISCHARGE DIAGNOSIS  Diagnosis: SBO (small bowel obstruction)  Assessment and Plan of Treatment:   ACTIVITY: No heavy lifting or straining. Otherwise, you may return to your usual level of physical activity.   DIET: Return to your usual diet, as tolerated.   NOTIFY YOUR SURGEON IF: any fever (over 100.4 F) or chills, persistent nausea/vomiting, persistent diarrhea, uncotrolled pain, chest pain, shortness of breath.   FOLLOW-UP: Please follow up with your primary care physician and Acute Care Surgery clinic, and your cardiologist, in 2 weeks regarding your hospitalization. Call for appointment upon discharge.

## 2021-11-21 NOTE — PROGRESS NOTE ADULT - SUBJECTIVE AND OBJECTIVE BOX
24h Events:  pt admitted to ACS surgery with SBO, NGT placed in ED. Patient hypotensive with lactate elevated > 6 on arrival. Pt recieved 3L IVF during the day, central line placed, started on vasopressors. Requring high dose levo (0.5 mcg/kg/hr), vasopressin added overnight. A line placed. Echo done, result pending. Pt received additional 1L IVF overnight. levophed weaned to 0.25 mcg/kg/hr. Lactate downtrending. Pt denies pain. 1 episode of N/V in ED, resolved with zofran and irrigation of NGT to improve function.       ICU Vital Signs Last 24 Hrs  T(C): 37.6 (2021 02:30), Max: 38.8 (2021 10:08)  T(F): 99.7 (2021 02:30), Max: 101.8 (2021 10:08)  HR: 102 (2021 04:10) (99 - 130)  BP: 121/82 (2021 04:10) (74/47 - 142/63)  BP(mean): 95 (2021 04:10) (74 - 95)  ABP: 132/64 (2021 04:10) (114/64 - 147/64)  ABP(mean): 86 (2021 04:10) (69 - 92)  RR: 24 (2021 04:10) (20 - 28)  SpO2: 95% (2021 04:10) (93% - 98%)      I&O's Detail    2021 07:01  -  2021 05:25  --------------------------------------------------------  IN:    IV PiggyBack: 100 mL    multiple electrolytes Injection Type 1.: 840 mL    Norepinephrine: 140 mL    Vasopressin: 70 mL  Total IN: 1150 mL    OUT:    Voided (mL): 740 mL  Total OUT: 740 mL    Total NET: 410 mL          ABG - ( 2021 23:10 )  pH, Arterial: 7.420 pH, Blood: x     /  pCO2: 33    /  pO2: 94    / HCO3: 21    / Base Excess: -3.1  /  SaO2: 98.9                MEDICATIONS  (STANDING):  chlorhexidine 2% Cloths 1 Application(s) Topical daily  heparin   Injectable 5000 Unit(s) SubCutaneous every 8 hours  multiple electrolytes Injection Type 1 1000 milliLiter(s) (120 mL/Hr) IV Continuous <Continuous>  norepinephrine Infusion 0.05 MICROgram(s)/kG/Min (7.69 mL/Hr) IV Continuous <Continuous>  pantoprazole  Injectable 40 milliGRAM(s) IV Push daily  vasopressin Infusion 0.04 Unit(s)/Min (2.4 mL/Hr) IV Continuous <Continuous>    MEDICATIONS  (PRN):  ondansetron Injectable 4 milliGRAM(s) IV Push every 6 hours PRN Nausea and/or Vomiting      Physical Exam:    Gen: Resting comfortably in bed    HEENT: PERRL, EOMI    Neurological: Alert and oriented x3 without focal deficit    Neck: Trachea midline, no evidence of JVD, FROM without pain, neck symmetric    Pulmonary: CTAB with decreased breath sounds at the bases    Cardiovascular: irregular rhythm, fast rate    Gastrointestinal: Soft, non-tender, non-distended    : Ramos in place draining clear yellow urine    Extremities: Without c/c/e    Skin: Intact    Musculoskeletal: FROM without pain, no deformity or areas of tenderness    LABS:  CBC Full  -  ( 2021 05:13 )  WBC Count : 17.60 K/uL  RBC Count : 4.77 M/uL  Hemoglobin : 13.4 g/dL  Hematocrit : 41.3 %  Platelet Count - Automated : 229 K/uL  Mean Cell Volume : 86.6 fl  Mean Cell Hemoglobin : 28.1 pg  Mean Cell Hemoglobin Concentration : 32.4 gm/dL  Auto Neutrophil # : x  Auto Lymphocyte # : x  Auto Monocyte # : x  Auto Eosinophil # : x  Auto Basophil # : x  Auto Neutrophil % : x  Auto Lymphocyte % : x  Auto Monocyte % : x  Auto Eosinophil % : x  Auto Basophil % : x    11-16    138  |  101  |  32.8<H>  ----------------------------<  143<H>  3.5   |  19.0<L>  |  1.33<H>    Ca    8.0<L>      2021 22:44  Phos  3.9     11-16  Mg     1.3     -16    TPro  5.7<L>  /  Alb  3.5  /  TBili  0.8  /  DBili  x   /  AST  161<H>  /  ALT  151<H>  /  AlkPhos  90  -    PT/INR - ( 2021 10:16 )   PT: 14.9 sec;   INR: 1.30 ratio         PTT - ( 2021 10:16 )  PTT:23.5 sec  Urinalysis Basic - ( 2021 12:28 )    Color: Yellow / Appearance: Clear / S.015 / pH: x  Gluc: x / Ketone: Trace  / Bili: Negative / Urobili: Negative   Blood: x / Protein: 100 / Nitrite: Positive   Leuk Esterase: Trace / RBC: 0-2 /HPF / WBC 3-5   Sq Epi: x / Non Sq Epi: Occasional / Bacteria: Occasional      RECENT CULTURES:      LIVER FUNCTIONS - ( 2021 22:44 )  Alb: 3.5 g/dL / Pro: 5.7 g/dL / ALK PHOS: 90 U/L / ALT: 151 U/L / AST: 161 U/L / GGT: x           CARDIAC MARKERS ( 2021 22:44 )  x     / 0.03 ng/mL / x     / x     / x      CARDIAC MARKERS ( 2021 10:16 )  x     / <0.01 ng/mL / x     / x     / x            ASSESSMENT/PLAN:  86 y.o female admitted with SBO, and hypovolemic +/- septic shock likely due to multiple days of nausea and vomiting prior to presenting in ED. Pt admitted to SICU for hemodynamic monitoring and vasopressor requirement.    Neuro:   - frequently assess pain  - pain has been adequately controlled with Tylenol  - regular sleep wake cycle  - delirium precautions    CV:   - clint track   - f/u echo result  - wean levophed and vasopressin as tolerated  - trend lactate until < 2  - continue volume expansion with crystalloid, plyte @ 125 ml/hr    Pulm:   - NC as needed to maintain Spo2 >90%  - incentive spirometry 10x/hr while awake  - pulm toilet  - encourage coughing and deep breathing    GI/Nutrition:   - NPO  - NGT LCWS  - protonix for GI ppx  - if lactate remains elevated despite adequate fluid resuscitation and patient continues to require vasopressors, pt may need surgical intervention    /Renal: Ramos for strict I&O  - strict I/Os, ramos catheter  - hypokalemia - follow up labs after repletions has finished infusing  - continue IVF for hydration    ID:   - no current need for antibiotics  - UA +, UC pending  - BCX sent , follow up result    Endo:   - no issues    Skin:   - Repositioning for DTI prevention while in bed    Heme/DVT Prophylaxis:   - SCDs  - SQH    Lines/Tubes:   - RIJ TLC  - L radial A line  - ramos catheter  - NGT    Dispo:   - SICU    
Acute Care Surgery/Trauma Surgery Progress Note:    Patient downgraded from the SICU yesterday 11/19 in the afternoon.  She tolerated a liquid diet for lunch and then tolerated a small amount of  regular diet for dinner.  She endorses minimal LLQ abdominal pain but otherwise is more concerned about the bacteremia found in her blood and does not want to go home until the source is found.  Her last BM was 11/19 in the AM.      No acute overnight events. Patient afebrile, VSS. Pain well controlled. Denies n/v/f/c/cp/sob.     Diet, DASH/TLC:   Sodium & Cholesterol Restricted  Supplement Feeding Modality:  Oral  Ensure Enlive Cans or Servings Per Day:  2       Frequency:  Two Times a day (11-19-21 @ 11:53)      Scheduled Medications:   amoxicillin  875 milliGRAM(s)/clavulanate 1 Tablet(s) Oral two times a day  apixaban 5 milliGRAM(s) Oral every 12 hours  ascorbic acid 500 milliGRAM(s) Oral daily  cholecalciferol 1000 Unit(s) Oral daily  levothyroxine 50 MICROGram(s) Oral daily  metoprolol tartrate 25 milliGRAM(s) Oral two times a day  multivitamin 1 Tablet(s) Oral daily    PRN Medications:  metoprolol tartrate Injectable 2.5 milliGRAM(s) IV Push every 6 hours PRN HR > 120 bpm  ondansetron Injectable 4 milliGRAM(s) IV Push every 6 hours PRN Nausea and/or Vomiting      Objective:   T(F): 97.6 (11-19 @ 21:00), Max: 98.4 (11-19 @ 15:51)  HR: 111 (11-19 @ 21:00) (103 - 138)  BP: 125/76 (11-19 @ 21:00) (81/65 - 125/76)  BP(mean): 85 (11-19 @ 12:00) (68 - 88)  ABP: 112/61 (11-19 @ 12:00) (84/47 - 137/68)  ABP(mean): 78 (11-19 @ 12:00) (-20 - 105)  RR: 17 (11-19 @ 21:00) (17 - 28)  SpO2: 92% (11-19 @ 21:00) (91% - 96%)      Physical Exam:   GEN: patient resting comfortably in bed, in no acute distress  RESP: respirations are unlabored, no accessory muscle use, no conversational dyspnea  CVS: RRR  GI: Abdomen soft, mildly tender to deep palpation of LLQ, non-distended, no rebound tenderness / guarding    I&O's    11-18 @ 07:01  -  11-19 @ 07:00  --------------------------------------------------------  IN:    IV PiggyBack: 50 mL    IV PiggyBack: 300 mL    IV PiggyBack: 250 mL    multiple electrolytes Injection Type 1.: 240 mL    Norepinephrine: 46 mL    Oral Fluid: 60 mL  Total IN: 946 mL    OUT:    Indwelling Catheter - Urethral (mL): 100 mL    Vasopressin: 0 mL    Voided (mL): 575 mL  Total OUT: 675 mL    Total NET: 271 mL      11-19 @ 07:01  -  11-20 @ 01:40  --------------------------------------------------------  IN:  Total IN: 0 mL    OUT:    Voided (mL): 350 mL  Total OUT: 350 mL    Total NET: -350 mL          LABS:                        10.3   8.33  )-----------( 137      ( 19 Nov 2021 04:29 )             32.9     11-19    143  |  110<H>  |  18.3  ----------------------------<  82  3.9   |  26.0  |  0.80    Ca    8.2<L>      19 Nov 2021 04:29  Phos  1.8     11-19  Mg     1.9     11-19            MICROBIOLOGY:     Culture - Urine (collected 11-18 @ 16:31)  Source: Clean Catch Clean Catch (Midstream)  Final Report (11-19 @ 11:27):    No growth    Culture - Urine (collected 11-16 @ 16:54)  Source: Clean Catch Clean Catch (Midstream)  Final Report (11-17 @ 15:31):    <10,000 CFU/mL Normal Urogenital Precious    Culture - Blood (collected 11-16 @ 10:26)  Source: .Blood Blood-Peripheral  Gram Stain (11-17 @ 13:49):    Growth in aerobic and anaerobic bottles: Gram Negative Rods    ***Blood Panel PCR results on this specimen are available    approximately 3 hours after the Gram stain result.***    Gram stain, PCR, and/or culture results may not always    correspond due todifference in methodologies.    ************************************************************    This PCR assay was performed using [employer].    The following targets are tested for: Enterococcus,    vancomycin resistant enterococci, Listeria monocytogenes,    coagulase negative staphylococci, S. aureus,    methicillin resistant S. aureus, Streptococcus agalactiae    (Group B), S. pneumoniae, S. pyogenes (Group A),    Acinetobacter baumannii, Enterobacter cloacae, E. coli,    Klebsiella oxytoca, K. pneumoniae, Proteus sp.,    Serratia marcescens, Haemophilus influenzae,    Neisseria meningitidis, Pseudomonas aeruginosa, Candida    albicans, C. glabrata, C krusei, C parapsilosis,    C. tropicalis and the KPC resistance gene.    "Due to technical problems, Pseudomonas aeruginosa    until further notice".    Gram Stain and BCID performed by:    [facility]    [address] [place] [id number]    .    TYPE: (C=Critical, N=Notification, A=Abnormal) C    TESTS:  _ MEÑO    DATE/TIME CALLED: _ 11/17/2021 08:33:45    CALLED TO: Sonny Jacobs RN    READ BACK (2 Patient Identifiers)(Y/N): _ Y    READ BACK VALUES (Y/N): _ Y    CALLED BY: Sonny Yuen  Final Report (11-19 @ 11:44):    Growth in aerobic and anaerobic bottles: Escherichia coli  Organism: Blood Culture PCR  Escherichia coli (11-19 @ 11:44)  Organism: Escherichia coli (11-19 @ 11:44)      -  Amikacin: S <=16      -  Ampicillin: S <=8 These ampicillin results predict results for amoxicillin      -  Ampicillin/Sulbactam: S <=4/2 Enterobacter, Klebsiella aerogenes, Citrobacter, and Serratia may develop resistance during prolonged therapy (3-4 days)      -  Aztreonam: S <=4      -  Cefazolin: S <=2 Enterobacter, Klebsiella aerogenes, Citrobacter, and Serratia may develop resistance during prolonged therapy (3-4 days)      -  Cefepime: S <=2      -  Cefoxitin: S <=8      -  Ceftriaxone: S <=1 Enterobacter, Klebsiella aerogenes, Citrobacter, and Serratia may develop resistance during prolonged therapy      -  Ciprofloxacin: S <=0.25      -  Ertapenem: S <=0.5      -  Gentamicin: S <=2      -  Imipenem: S <=1      -  Levofloxacin: S <=0.5      -  Meropenem: S <=1      -  Piperacillin/Tazobactam: S <=8      -  Tobramycin: S <=2      -  Trimethoprim/Sulfamethoxazole: S <=0.5/9.5      Method Type: RANGEL  Organism: Blood Culture PCR (11-19 @ 11:44)      -  Escherichia coli: Detec      Method Type: PCR    Culture - Blood (collected 11-16 @ 10:23)  Source: .Blood Blood-Peripheral  Gram Stain (11-18 @ 10:55):    Growth in aerobic and anaerobic bottles: Gram Negative Rods    Gram Stain performed by:    [facility]    [address] [place] [address]    .    TYPE: (C=Critical, N=Notification, A=Abnormal) C    TESTS:  _ GS    DATE/TIME CALLED: _ 11/17/2021 08:32:00    CALLED TO: Sonny Jacobs RN    READ BACK (2 Patient Identifiers)(Y/N): _ Y    READ BACK VALUES (Y/N): _ Y    CALLED BY: Sonny Yuen  Final Report (11-19 @ 17:43):    Growth in aerobic and anaerobic bottles: Escherichia coli    See previous culture [id number]        PATHOLOGY:      
24h Events:  NGT removed, started CLD. Tolerated well. weaned off levophed in afternoon. OOB to chair during the day. Eliquis restarted. Metoprolol 25 BID restarted w/ 2.5 mg PRN for sustains HR > 120. ramos removed, passed TOV.    ICU Vital Signs Last 24 Hrs  T(C): 36.4 (2021 00:01), Max: 37.1 (2021 23:56)  T(F): 97.6 (2021 00:01), Max: 98.7 (2021 23:56)  HR: 125 (2021 02:00) (105 - 159)  BP: 107/65 (2021 02:00) (91/76 - 126/75)  BP(mean): 79 (2021 02:00) (71 - 91)  ABP: 84/47 (2021 02:00) (81/47 - 139/71)  ABP(mean): 61 (2021 02:00) (60 - 97)  RR: 22 (2021 02:00) (15 - 34)  SpO2: 96% (2021 02:00) (88% - 100%)    I&O's Detail    2021 07:01  -  2021 07:00  --------------------------------------------------------  IN:    IV PiggyBack: 375 mL    IV PiggyBack: 125 mL    multiple electrolytes Injection Type 1 Bolus: 1000 mL    multiple electrolytes Injection Type 1.: 2880 mL    Norepinephrine: 366.4 mL    Vasopressin: 33.6 mL  Total IN: 4780 mL    OUT:    Indwelling Catheter - Urethral (mL): 1880 mL    Nasogastric/Oral tube (mL): 200 mL  Total OUT: 2080 mL    Total NET: 2700 mL    2021 07:01  -  2021 02:16  --------------------------------------------------------  IN:    IV PiggyBack: 125 mL    IV PiggyBack: 250 mL    multiple electrolytes Injection Type 1.: 240 mL    Norepinephrine: 46 mL  Total IN: 661 mL    OUT:    Indwelling Catheter - Urethral (mL): 100 mL    Vasopressin: 0 mL    Voided (mL): 275 mL  Total OUT: 375 mL    Total NET: 286 mL    MEDICATIONS  (STANDING):  apixaban 5 milliGRAM(s) Oral every 12 hours  ascorbic acid 500 milliGRAM(s) Oral daily  chlorhexidine 2% Cloths 1 Application(s) Topical daily  cholecalciferol 1000 Unit(s) Oral daily  levothyroxine 50 MICROGram(s) Oral daily  metoprolol tartrate 25 milliGRAM(s) Oral two times a day  multivitamin 1 Tablet(s) Oral daily  norepinephrine Infusion 0.05 MICROgram(s)/kG/Min (7.69 mL/Hr) IV Continuous <Continuous>  piperacillin/tazobactam IVPB.. 3.375 Gram(s) IV Intermittent every 8 hours    MEDICATIONS  (PRN):  metoprolol tartrate Injectable 2.5 milliGRAM(s) IV Push every 6 hours PRN HR > 120 bpm  ondansetron Injectable 4 milliGRAM(s) IV Push every 6 hours PRN Nausea and/or Vomiting      Physical Exam:    Gen: Resting comfortably in bed    HEENT: PERRL, EOMI    Neurological: Alert and oriented x3 without focal deficit    Neck: Trachea midline, no evidence of JVD, FROM without pain, neck symmetric    Pulmonary: CTAB with decreased breath sounds at the bases    Cardiovascular: irregular rhythm, fast rate    Gastrointestinal: Soft, non-tender, non-distended    : Ramos in place draining clear yellow urine    Extremities: Without c/c/e    Skin: Intact    Musculoskeletal: FROM without pain, no deformity or areas of tenderness    LABS:  CBC Full  -  ( 2021 05:04 )  WBC Count : 9.82 K/uL  RBC Count : 3.93 M/uL  Hemoglobin : 10.9 g/dL  Hematocrit : 34.0 %  Platelet Count - Automated : 147 K/uL  Mean Cell Volume : 86.5 fl  Mean Cell Hemoglobin : 27.7 pg  Mean Cell Hemoglobin Concentration : 32.1 gm/dL  Auto Neutrophil # : 8.20 K/uL  Auto Lymphocyte # : 0.34 K/uL  Auto Monocyte # : 0.17 K/uL  Auto Eosinophil # : 0.77 K/uL  Auto Basophil # : 0.18 K/uL  Auto Neutrophil % : 66.1 %  Auto Lymphocyte % : 3.5 %  Auto Monocyte % : 1.7 %  Auto Eosinophil % : 7.8 %  Auto Basophil % : 1.8 %        141  |  106  |  24.9<H>  ----------------------------<  101<H>  3.6   |  25.0  |  0.76    Ca    7.8<L>      2021 05:04  Phos  2.0       Mg     2.4         Urinalysis Basic - ( 2021 21:10 )    Color: Yellow / Appearance: Slightly Turbid / S.020 / pH: x  Gluc: x / Ketone: Negative  / Bili: Negative / Urobili: Negative mg/dL   Blood: x / Protein: 30 mg/dL / Nitrite: Negative   Leuk Esterase: Small / RBC: 6-10 /HPF / WBC 3-5   Sq Epi: x / Non Sq Epi: Few / Bacteria: Few    RECENT CULTURES:   Clean Catch Clean Catch (Midstream) XXXX XXXX   <10,000 CFU/mL Normal Urogenital Precious     .Blood Blood-Peripheral Blood Culture PCR   Growth in aerobic and anaerobic bottles: Gram Negative Rods  ***Blood Panel PCR results on this specimen are available  approximately 3 hours after the Gram stain result.***  Gram stain, PCR, and/or culture results may not always  correspond due todifference in methodologies.  ************************************************************  This PCR assay was performed using [employer].  The following targets are tested for: Enterococcus,  vancomycin resistant enterococci, Listeria monocytogenes,  coagulase negative staphylococci, S. aureus,  methicillin resistant S. aureus, Streptococcus agalactiae  (Group B), S. pneumoniae, S. pyogenes (Group A),  Acinetobacter baumannii, Enterobacter cloacae, E. coli,  Klebsiella oxytoca, K. pneumoniae, Proteus sp.,  Serratia marcescens, Haemophilus influenzae,  Neisseria meningitidis, Pseudomonas aeruginosa, Candida  albicans, C. glabrata, C krusei, C parapsilosis,  C. tropicalis and the KPC resistance gene.  "Due to technical problems, Pseudomonas aeruginosa  until further notice".  Gram Stain and BCID performed by:  [facility]  [address] [place] [address]  .  TYPE: (C=Critical, N=Notification, A=Abnormal) C  TESTS:  _ GS  DATE/TIME CALLED: _ 2021 08:33:45  CALLED TO: Sonny Jacobs RN  READ BACK (2 Patient Identifiers)(Y/N): _ Y  READ BACK VALUES (Y/N): _ Y  CALLED BY: Sonny Yuen   Growth in aerobic and anaerobic bottles: Escherichia coli    11-16 .Blood Blood-Peripheral XXXX   Growth in aerobic and anaerobic bottles: Gram Negative Rods  Gram Stain performed by:  [facility]  [address] [place] [address]  .  TYPE: (C=Critical, N=Notification, A=Abnormal) C  TESTS:  _ GS  DATE/TIME CALLED: _ 2021 08:32:00  CALLED TO: Sonny Jacobs RN  READ BACK (2 Patient Identifiers)(Y/N): _ Y  READ BACK VALUES (Y/N): _ Y  CALLED BY: Sonny Yuen   Growth in aerobic bottle: Escherichia coli  See previous culture [id number]    ASSESSMENT/PLAN:    86 y.o female admitted with SBO, and hypovolemic + septic shock 2/2 E coli bacteremia.     Neuro:   - frequently assess pain  - pain has been adequately controlled with Tylenol  - regular sleep wake cycle  - delirium precautions    CV:   - weaned off levophed  afternoon  - lactate remains clear  - Pt takes 100 mg metoprolol at home daily, slowly increase dose as tolerated back up to home dose. Currently on 25 mg BID with PRN 2.5 for HR > 120  - Remains in YVETTE, though with better rate control on beta blockers    Pulm:   - NC as needed to maintain Spo2 >90%  - incentive spirometry 10x/hr while awake  - pulm toilet  - encourage coughing and deep breathing  - OOB to chair during the day    GI/Nutrition:   - tolerating CLD  - advance in AM  - continues to pass flatus and + BM    /Renal:   - Ramos catheter d/c, passed TOV  - replete lytes as indicated  - CARLA resolved    ID:   - zosyn for E. coli bacteremia  - follow up sensitivies, de-escalate abx as able  - f/u repeat BCX (sent )   - repeat UA negative, UCX negative. Source still presumed to be urinary tract. UCX negative and repeat UA likely cleared after receiving ABX in ER.     Endo:   - no issues    Skin:   - Repositioning for DTI prevention while in bed    Heme/DVT Prophylaxis:   - SCDs  - Eliquis    Lines/Tubes:   - RIJ TLC, D/C in AM if remains off vasopressors  - L radial A line    Dispo:   - SICU  
24h Events:  recieved 1L bolus this AM for high variability on POCUS IVC exam. + BM, passing flatus throughout the day. NGT clamp trial with 210 output. NGT back to suction overnight. BCX resulted + for E. coli in both bottles. Urine culture sent as pt with + UA in ED. Ucx result negative. UA repeated overnight, now negative. Started on zosyn for treatment of E. coli bacteremia, source unclear. Lactate cleared. Vasopressin weaned off, levophed requirement < 0.1 mcg/kg/hr. UOP improving, approx 100 ml/hr.     ICU Vital Signs Last 24 Hrs  T(C): 36.8 (2021 04:25), Max: 37.5 (2021 19:35)  T(F): 98.3 (2021 04:25), Max: 99.5 (2021 19:35)  HR: 105 (2021 05:00) (95 - 131)  BP: 126/75 (2021 05:00) (84/70 - 126/75)  BP(mean): 91 (2021 05:00) (67 - 100)  ABP: 139/71 (2021 05:00) (81/47 - 139/71)  ABP(mean): 97 (2021 05:00) (61 - 97)  RR: 20 (2021 05:00) (17 - 36)  SpO2: 97% (2021 05:00) (92% - 100%)      I&O's Detail    2021 07:01  -  2021 07:00  --------------------------------------------------------  IN:    IV PiggyBack: 100 mL    multiple electrolytes Injection Type 1.: 1320 mL    Norepinephrine: 274 mL    Vasopressin: 79.6 mL  Total IN: 1773.6 mL    OUT:    Indwelling Catheter - Urethral (mL): 275 mL    Nasogastric/Oral tube (mL): 300 mL    Voided (mL): 740 mL  Total OUT: 1315 mL    Total NET: 458.6 mL      2021 07:01  -  2021 06:19  --------------------------------------------------------  IN:    IV PiggyBack: 300 mL    multiple electrolytes Injection Type 1 Bolus: 1000 mL    multiple electrolytes Injection Type 1.: 2640 mL    Norepinephrine: 339.6 mL    Vasopressin: 33.6 mL  Total IN: 4313.2 mL    OUT:    Indwelling Catheter - Urethral (mL): 1735 mL    Nasogastric/Oral tube (mL): 100 mL  Total OUT: 1835 mL    Total NET: 2478.2 mL          ABG - ( 2021 23:10 )  pH, Arterial: 7.420 pH, Blood: x     /  pCO2: 33    /  pO2: 94    / HCO3: 21    / Base Excess: -3.1  /  SaO2: 98.9                MEDICATIONS  (STANDING):  chlorhexidine 2% Cloths 1 Application(s) Topical daily  heparin   Injectable 5000 Unit(s) SubCutaneous every 8 hours  levothyroxine Injectable 37.5 MICROGram(s) IV Push at bedtime  multiple electrolytes Injection Type 1 1000 milliLiter(s) (120 mL/Hr) IV Continuous <Continuous>  norepinephrine Infusion 0.05 MICROgram(s)/kG/Min (7.69 mL/Hr) IV Continuous <Continuous>  pantoprazole  Injectable 40 milliGRAM(s) IV Push daily  piperacillin/tazobactam IVPB.. 3.375 Gram(s) IV Intermittent every 8 hours  potassium chloride  20 mEq/100 mL IVPB 20 milliEquivalent(s) IV Intermittent once  vasopressin Infusion 0.04 Unit(s)/Min (2.4 mL/Hr) IV Continuous <Continuous>    MEDICATIONS  (PRN):  ondansetron Injectable 4 milliGRAM(s) IV Push every 6 hours PRN Nausea and/or Vomiting      Physical Exam:    Gen: Resting comfortably in bed    HEENT: PERRL, EOMI    Neurological: Alert and oriented x3 without focal deficit    Neck: Trachea midline, no evidence of JVD, FROM without pain, neck symmetric    Pulmonary: CTAB with decreased breath sounds at the bases    Cardiovascular: irregular rhythm, fast rate    Gastrointestinal: Soft, non-tender, non-distended    : Ramos in place draining clear yellow urine    Extremities: Without c/c/e    Skin: Intact    Musculoskeletal: FROM without pain, no deformity or areas of tenderness    LABS:  CBC Full  -  ( 2021 05:04 )  WBC Count : 9.82 K/uL  RBC Count : 3.93 M/uL  Hemoglobin : 10.9 g/dL  Hematocrit : 34.0 %  Platelet Count - Automated : 147 K/uL  Mean Cell Volume : 86.5 fl  Mean Cell Hemoglobin : 27.7 pg  Mean Cell Hemoglobin Concentration : 32.1 gm/dL  Auto Neutrophil # : 8.20 K/uL  Auto Lymphocyte # : 0.34 K/uL  Auto Monocyte # : 0.17 K/uL  Auto Eosinophil # : 0.77 K/uL  Auto Basophil # : 0.18 K/uL  Auto Neutrophil % : 66.1 %  Auto Lymphocyte % : 3.5 %  Auto Monocyte % : 1.7 %  Auto Eosinophil % : 7.8 %  Auto Basophil % : 1.8 %        141  |  106  |  24.9<H>  ----------------------------<  101<H>  3.6   |  25.0  |  0.76    Ca    7.8<L>      2021 05:04  Phos  2.0       Mg     2.4         TPro  5.7<L>  /  Alb  3.5  /  TBili  0.8  /  DBili  x   /  AST  161<H>  /  ALT  151<H>  /  AlkPhos  90      PT/INR - ( 2021 10:16 )   PT: 14.9 sec;   INR: 1.30 ratio         PTT - ( 2021 10:16 )  PTT:23.5 sec  Urinalysis Basic - ( 2021 21:10 )    Color: Yellow / Appearance: Slightly Turbid / S.020 / pH: x  Gluc: x / Ketone: Negative  / Bili: Negative / Urobili: Negative mg/dL   Blood: x / Protein: 30 mg/dL / Nitrite: Negative   Leuk Esterase: Small / RBC: 6-10 /HPF / WBC 3-5   Sq Epi: x / Non Sq Epi: Few / Bacteria: Few      RECENT CULTURES:   Clean Catch Clean Catch (Midstream) XXXX XXXX   <10,000 CFU/mL Normal Urogenital Precious     .Blood Blood-Peripheral Blood Culture PCR   Growth in aerobic and anaerobic bottles: Gram Negative Rods  ***Blood Panel PCR results on this specimen are available  approximately 3 hours after the Gram stain result.***  Gram stain, PCR, and/or culture results may not always  correspond due todifference in methodologies.  ************************************************************  This PCR assay was performed using [employer].  The following targets are tested for: Enterococcus,  vancomycin resistant enterococci, Listeria monocytogenes,  coagulase negative staphylococci, S. aureus,  methicillin resistant S. aureus, Streptococcus agalactiae  (Group B), S. pneumoniae, S. pyogenes (Group A),  Acinetobacter baumannii, Enterobacter cloacae, E. coli,  Klebsiella oxytoca, K. pneumoniae, Proteus sp.,  Serratia marcescens, Haemophilus influenzae,  Neisseria meningitidis, Pseudomonas aeruginosa, Candida  albicans, C. glabrata, C krusei, C parapsilosis,  C. tropicalis and the KPC resistance gene.  "Due to technical problems, Pseudomonas aeruginosa  until further notice".  Gram Stain and BCID performed by:  [facility]  [address] [place] [address]  .  TYPE: (C=Critical, N=Notification, A=Abnormal) C  TESTS:  _ GS  DATE/TIME CALLED: _ 2021 08:33:45  CALLED TO: Sonny Jacobs RN  READ BACK (2 Patient Identifiers)(Y/N): _ Y  READ BACK VALUES (Y/N): _ Y  CALLED BY: Sonny CAMPBELL     .Blood Blood-Peripheral XXXX   Growth in aerobic bottle: Gram Negative Rods  Gram Stain performed by:  [facility]  [address] [place] [address]  .  TYPE: (C=Critical, N=Notification, A=Abnormal) C  TESTS:  _ GS  DATE/TIME CALLED: _ 2021 08:32:00  CALLED TO: Sonny Jacobs RN  READ BACK (2 Patient Identifiers)(Y/N): _ Y  READ BACK VALUES (Y/N): _ Y  CALLED BY: Sonny SCOTTXX        LIVER FUNCTIONS - ( 2021 22:44 )  Alb: 3.5 g/dL / Pro: 5.7 g/dL / ALK PHOS: 90 U/L / ALT: 151 U/L / AST: 161 U/L / GGT: x           CARDIAC MARKERS ( 2021 22:44 )  x     / 0.03 ng/mL / x     / x     / x      CARDIAC MARKERS ( 2021 10:16 )  x     / <0.01 ng/mL / x     / x     / x        ASSESSMENT/PLAN:    86 y.o female admitted with SBO, and hypovolemic +/- septic shock likely due to multiple days of nausea and vomiting prior to presenting in ED. Pt admitted to SICU for hemodynamic monitoring and vasopressor requirement.    Neuro:   - frequently assess pain  - pain has been adequately controlled with Tylenol  - regular sleep wake cycle  - delirium precautions    CV:   - wean levophed   - lactate clear  - intermittent episodes of YVETTE - consider starting low dose beta blocker today if pressor requirements remain stable  - continue volume expansion with crystalloid, plyte @ 125 ml/hr    Pulm:   - NC as needed to maintain Spo2 >90%  - incentive spirometry 10x/hr while awake  - pulm toilet  - encourage coughing and deep breathing    GI/Nutrition:   - NGT with minimal output overnight  - start CLD this AM  - protonix for GI ppx    /Renal: Ramos for strict I&O  - strict I/Os, ramos catheter  - continue IVF for hydration  - replete lytes as indicated    ID:   - repeat bcx  - repeat UA negative, UCX negative  - can consider exchanging line, however has been in place < 48 hours    Endo:   - no issues    Skin:   - Repositioning for DTI prevention while in bed    Heme/DVT Prophylaxis:   - SCDs  - SQH, re-start full AC today    Lines/Tubes:   - RIJ TLC  - L radial A line  - ramos catheter  - NGT    Dispo:   - SICU        
HPI/OVERNIGHT EVENTS: Patient seen and examined at bedside this AM. No overnight events. tolerating diet, pending blood culture results  Vital Signs Last 24 Hrs  T(C): 36.8 (20 Nov 2021 21:30), Max: 36.8 (20 Nov 2021 16:05)  T(F): 98.2 (20 Nov 2021 21:30), Max: 98.3 (20 Nov 2021 16:05)  HR: 120 (20 Nov 2021 21:30) (98 - 130)  BP: 129/83 (20 Nov 2021 21:30) (118/75 - 138/85)  BP(mean): --  RR: 18 (20 Nov 2021 21:30) (18 - 18)  SpO2: 93% (20 Nov 2021 16:05) (92% - 96%)    I&O's Detail    19 Nov 2021 07:01  -  20 Nov 2021 07:00  --------------------------------------------------------  IN:  Total IN: 0 mL    OUT:    Voided (mL): 750 mL  Total OUT: 750 mL    Total NET: -750 mL          Constitutional: patient resting comfortably in bed, in no acute distress  HEENT: EOMI, PERRLA, MMM.  Respiratory: Non labored breathing on RA  Cardiovascular: RRR  Gastrointestinal: Abdomen soft, non-tender, non-distended, no rebound tenderness / guarding  Musculoskeletal: No joint pain, swelling or deformity; no limitation of movement  Vascular: Extremities warm and well perfused.     LABS:                        10.3   6.52  )-----------( 136      ( 20 Nov 2021 07:43 )             33.1     11-20    144  |  108<H>  |  17.0  ----------------------------<  83  4.1   |  25.0  |  0.67    Ca    8.1<L>      20 Nov 2021 07:43  Phos  3.2     11-20  Mg     1.9     11-20            MEDICATIONS  (STANDING):  amoxicillin  875 milliGRAM(s)/clavulanate 1 Tablet(s) Oral two times a day  apixaban 5 milliGRAM(s) Oral every 12 hours  ascorbic acid 500 milliGRAM(s) Oral daily  cholecalciferol 1000 Unit(s) Oral daily  levothyroxine 50 MICROGram(s) Oral daily  metoprolol succinate  milliGRAM(s) Oral daily  multivitamin 1 Tablet(s) Oral daily    MEDICATIONS  (PRN):  ondansetron Injectable 4 milliGRAM(s) IV Push every 6 hours PRN Nausea and/or Vomiting      MICRO:   Cultures     STUDIES:   EKG, CXR, U/S, CT, MRI

## 2021-11-21 NOTE — DISCHARGE NOTE PROVIDER - NSFOLLOWUPCLINICS_GEN_ALL_ED_FT
[facility] Acute Care Surgery  Acute Care Surgery  [address] [place] [address]  Phone: [phone]  Fax:

## 2021-11-23 LAB
CULTURE RESULTS: SIGNIFICANT CHANGE UP
SPECIMEN SOURCE: SIGNIFICANT CHANGE UP

## 2021-11-23 NOTE — CDI QUERY NOTE - NSCDIOTHERTXTBX_GEN_ALL_CORE_HH
HPI:  86 year old female, admitted on 11/16/2021 with "SBO....lactate severely elevated and currently on levophed....patient will require significant resucitation..." per the H&P.  Vital signs on admission show heart rate 120, respiratory rate 22, BP 88/58, and temperature 101.8.  Labs on admission show WBC 23.61.  The 11/17/2021 SICU Progress Note documents "hypovolemic +/- septic shock". The 11/18/2021 Progress Note documents "Started on zosyn for treatment of E. coli bacteremia, source unclear".  The Discharge Note documents "...found to have ecoli bacteremia, was treated with IV antibiotics then switched to oral."    Please clarify the diagnosis of Septic Shock.    A)  Sepsis as evidenced by heart rate 120, respiratory rate 22, BP 88/58, temperature 101.8 and WBC 23.61, with E Coli Bacteremia and Septic Shock, present on admission, resolved  B)  Other, please specify  C)  Not clinically significant     Supporting Documentation:      H&P Adult [Charted Location: [facility] ERHR 160 68] [Authored: 16-Nov-2021 17:55]  ASSESSMENT: Patient is a 86y old female with SBO probably at distal anastomosis, difusely distended bowel, obstipation, constipation, however lactate severely elevated and currently on levophed thorugh pIV, NGT placed at bedside, however patient will require significant resucitation, lNGT decompression, and SICU adission given Afib and pressor requirements    PLAN:    - Admit to [facility] ICU under Dr. Turner  - NPO/IVF Albiumin  - DVT ppx  - Central line a line  - ramos for close monitoring  - NGT to LCIS  - strict       Progress Note Adult-SICU Nurse Practitioner/Attending [Charted Location: [facility] 3E 3113 01] [Authored: 17-Nov-2021 05:25]  ASSESSMENT/PLAN:  86 y.o female admitted with SBO, and hypovolemic +/- septic shock likely due to multiple days of nausea and vomiting prior to presenting in ED. Pt admitted to SICU for hemodynamic monitoring and vasopressor requirement.     ,,ED ADULT Flow Sheet [Charted Location: [facility] ED] [Authored: 16-Nov-2021 09:48]- for Visit: [id number],  Alice Chris (RN); Calvin Manzano (RN), Complete, Revised, Signed in Full, General      Vital Signs  Temperature  Temp (F): 98.6 Degrees F  Temp (C) Temp (C): 37 Degrees C  Temp site Temp Site: oral  Heart Rate  Heart Rate Heart Rate (beats/min): 120         ,,ED ADULT Flow Sheet [Charted Location: [facility] ED] [Authored: 16-Nov-2021 10:08]- for Visit: [id number],  Alice Chris (RN), Complete, Entered, Signed in Full, General  Vital Signs  Temperature  Temp (F): 101.8 Degrees F  Temp (C) Temp (C): 38.8 Degrees C  Heart Rate  Heart Rate Heart Rate (beats/min): 130 /min  Noninvasive Blood Pressure  BP Systolic Systolic: 88 mm Hg  BP Diastolic Diastolic (mm Hg): 58 mm Hg  ECG  ECG Rhythm: atrial fibrillation      ,,ED ADULT Flow Sheet [Charted Location: [facility] ED] [Authored: 16-Nov-2021 10:13]- for Visit: [id number],  Alice Chris), Complete, Entered, Signed in Full, General  Vital Signs  Heart Rate  Heart Rate Heart Rate (beats/min): 128 /min  Noninvasive Blood Pressure  BP Systolic Systolic: 100 mm Hg  BP Diastolic Diastolic (mm Hg): 58 mm Hg  Respiratory/Pulse Oximetry/Oxygen Therapy  Respiration Rate (breaths/min) Respiration Rate (breaths/min): 22 /min  SpO2 (%) SpO2 (%): 97 %  O2 Delivery/Oxygen Delivery Method Patient On (Patient Delivery Method): room air    WBC Count: 6.52 K/uL (11.20.21 @ 07:43)   WBC Count: 8.33 K/uL (11.19.21 @ 04:29)   WBC Count: 9.82 K/uL (11.18.21 @ 05:04)   WBC Count: 9.87: Test Repeated K/uL (11.17.21 @ 18:32)   WBC Count: 17.60 K/uL (11.17.21 @ 05:13)   WBC Count: 23.61: Test Repeated Specimen Integrity Verified. K/uL (11.16.21 @ 22:44)   WBC Count: 3.66 K/uL (11.16.21 @ 10:16)     Lactate, Blood: 1.5 mmol/L (11.17.21 @ 18:32)   Lactate, Blood: 2.5 mmol/L (11.17.21 @ 05:05)     Blood Gas Venous - Lactate: 4.60: TYPE:(C=Critical, N=Notification, A=Abnormal) c   TESTS: _lactatewbv     DATE/TIME CALLED:11/16/2021 13:11:50 EST _ Culture - Blood (11.16.21 @ 10:26)   - Escherichia coli: Detec   Gram Stain:   Growth in aerobic and anaerobic bottles: Gram Negative Rods   ***Blood Panel PCR results on this specimen are available   approximately 3 hours after the Gram stain result.***   Gram stain, PCR, and/or culture results may not always   correspond due todifference in methodologies.   ************************************************************   This PCR assay was performed using [employer].   The following targets are tested for: Enterococcus,   vancomycin resistant enterococci, Listeria monocytogenes,   coagulase negative staphylococci, S. aureus,   methicillin resistant S. aureus, Streptococcus agalactiae   (Group B), S. pneumoniae, S. pyogenes (Group A),   Acinetobacter baumannii, Enterobacter cloacae, E. coli,   Klebsiella oxytoca, K. pneumoniae, Proteus sp.,   Serratia marcescens, Haemophilus influenzae,   Neisseria meningitidis, Pseudomonas aeruginosa, Candida   albicans, C. glabrata, C krusei, C parapsilosis,   C. tropicalis and the KPC resistance gene.   "Due to technical problems, Pseudomonas aeruginosa   until further notice".   Gram Stain and BCID performed by:   [facility]   [address] [place] [id number]   .   TYPE: (C=Critical, N=Notification, A=Abnormal) C   TESTS: _ GS   DATE/TIME CALLED: _ 11/17/2021 08:33:45   CALLED TO: Sonny Jacobs RN   READ BACK (2 Patient Identifiers)(Y/N): _ Y   READ BACK VALUES (Y/N): _ Y   CALLED BY: _ Sajaz   - Amikacin: S <=16   - Ampicillin: S <=8 These ampicillin results predict results for amoxicillin   - Ampicillin/Sulbactam: S <=4/2 Enterobacter, Klebsiella aerogenes, Citrobacter, and Serratia may develop resistance during prolonged therapy (3-4 days)   - Aztreonam: S <=4   - Cefazolin: S <=2 Enterobacter, Klebsiella aerogenes, Citrobacter, and Serratia may develop resistance during prolonged therapy (3-4 days)   - Cefepime: S <=2   - Cefoxitin: S <=8   - Ceftriaxone: S <=1 Enterobacter, Klebsiella aerogenes, Citrobacter, and Serratia may develop resistance during prolonged therapy   - Ciprofloxacin: S <=0.25   - Ertapenem: S <=0.5   - Gentamicin: S <=2   - Imipenem: S <=1   - Levofloxacin: S <=0.5   - Meropenem: S <=1   - Piperacillin/Tazobactam: S <=8   - Tobramycin: S <=2   - Trimethoprim/Sulfamethoxazole: S <=0.5/9.5   Specimen Source: .Blood Blood-Peripheral   Organism: Blood Culture PCR   Organism: Escherichia coli   Culture Results:   Growth in aerobic and anaerobic bottles: Escherichia coli   Organism Identification: Blood Culture PCR   Escherichia coli   Method Type: RANGEL   Method Type: PCR     Progress Note Adult-SICU Nurse Practitioner/Attending [Charted Location: [facility] 3E 3113 01] [Authored: 18-Nov-2021 06:18]  24h Events:  recieved 1L bolus this AM for high variability on POCUS IVC exam. + BM, passing flatus throughout the day. NGT clamp trial with 210 output. NGT back to suction overnight. BCX resulted + for E. coli in both bottles. Urine culture sent as pt with + UA in ED. Ucx result negative. UA repeated overnight, now negative. Started on zosyn for treatment of E. coli bacteremia, source unclear. Lactate cleared. Vasopressin weaned off, levophed requirement < 0.1 mcg/kg/hr. UOP improving, approx 100 ml/hr.     Discharge Note Provider [Charted Location: [address] 9611 01] [Authored: 21-Nov-2021 12:33]- for Visit: [id number], Complete, Not Revised, Signed in Full, General   Hospital Course:  Discharge Date	21-Nov-2021  Admission Date	16-Nov-2021 16:50  Hospital Course	  86y Female present to ED with abdominal pain nausea and vomiting which started yesterday after eating tomato soup, this morning felt anxious and febrile reason why she came. she denies flatus or bowel movements since yesterday pain is diffuse. 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 CT findings consistent with SBO, also found to be in afib, she was admitted to the surgery service to the ICU for resuscitation.  A NGT was placed for bowel decompression. Rest of course was uneventful, she started to have bowel function, the NGT was clamped and removed after she passed a clamp trial. She was adequately resuscitated in ICU. She was downgraded from ICU to floor. She was found to have ecoli bacteremia, was treated with IV antibiotics then switched to oral. Blood cultures cleared. Source of bacteremia likely from SBO which resolved with non op management. She remained on low dose metoprolol (home med) while in the hospital, as she was hypotensive during the beginning of her visit, but her home dose of metoprolol was weaned up. Upon discharge, pain well controlled, tolerating diet, ambulating and voiding independently, Stable for discharge home per attending with outpatient follow up.

## 2021-11-24 NOTE — CHART NOTE - NSCHARTNOTEFT_GEN_A_CORE
Please note upon review of chart the following diagnosis exists for this patient:    1)  Sepsis as evidenced by heart rate 120, respiratory rate 22, BP 88/58, temperature 101.8 and WBC 23.61, with E Coli Bacteremia and Septic Shock, present on admission, resolved    H&P Adult [Charted Location: [facility] ERHR 1606 68] [Authored: 16-Nov-2021 17:55]  ASSESSMENT: Patient is a 86y old female with SBO probably at distal anastomosis, difusely distended bowel, obstipation, constipation, however lactate severely elevated and currently on levophed thorugh pIV, NGT placed at bedside, however patient will require significant resucitation, lNGT decompression, and SICU adission given Afib and pressor requirements    PLAN:    - Admit to [facility] ICU under Dr. Turner  - NPO/IVF Albiumin  - DVT ppx  - Central line a line  - ramos for close monitoring  - NGT to LCIS  - strict       Progress Note Adult-SICU Nurse Practitioner/Attending [Charted Location: [address] 3113 01] [Authored: 17-Nov-2021 05:25]  ASSESSMENT/PLAN:  86 y.o female admitted with SBO, and hypovolemic +/- septic shock likely due to multiple days of nausea and vomiting prior to presenting in ED. Pt admitted to SICU for hemodynamic monitoring and vasopressor requirement.     ,,ED ADULT Flow Sheet [Charted Location: [facility] ED] [Authored: 16-Nov-2021 09:48]- for Visit: [id number],  Alice Chris (RN); Calvin Manzano (RN), Complete, Revised, Signed in Full, General      Vital Signs  Temperature  Temp (F): 98.6 Degrees F  Temp (C) Temp (C): 37 Degrees C  Temp site Temp Site: oral  Heart Rate  Heart Rate Heart Rate (beats/min): 120         ,,ED ADULT Flow Sheet [Charted Location: [facility] ED] [Authored: 16-Nov-2021 10:08]- for Visit: [id number],  Alice Chris (RN), Complete, Entered, Signed in Full, General  Vital Signs  Temperature  Temp (F): 101.8 Degrees F  Temp (C) Temp (C): 38.8 Degrees C  Heart Rate  Heart Rate Heart Rate (beats/min): 130 /min  Noninvasive Blood Pressure  BP Systolic Systolic: 88 mm Hg  BP Diastolic Diastolic (mm Hg): 58 mm Hg  ECG  ECG Rhythm: atrial fibrillation      ,,ED ADULT Flow Sheet [Charted Location: [facility] ED] [Authored: 16-Nov-2021 10:13]- for Visit: [id number],  Alice Chris (RN), Complete, Entered, Signed in Full, General  Vital Signs  Heart Rate  Heart Rate Heart Rate (beats/min): 128 /min  Noninvasive Blood Pressure  BP Systolic Systolic: 100 mm Hg  BP Diastolic Diastolic (mm Hg): 58 mm Hg  Respiratory/Pulse Oximetry/Oxygen Therapy  Respiration Rate (breaths/min) Respiration Rate (breaths/min): 22 /min  SpO2 (%) SpO2 (%): 97 %  O2 Delivery/Oxygen Delivery Method Patient On (Patient Delivery Method): room air    WBC Count: 6.52 K/uL (11.20.21 @ 07:43)   WBC Count: 8.33 K/uL (11.19.21 @ 04:29)   WBC Count: 9.82 K/uL (11.18.21 @ 05:04)   WBC Count: 9.87: Test Repeated K/uL (11.17.21 @ 18:32)   WBC Count: 17.60 K/uL (11.17.21 @ 05:13)   WBC Count: 23.61: Test Repeated Specimen Integrity Verified. K/uL (11.16.21 @ 22:44)   WBC Count: 3.66 K/uL (11.16.21 @ 10:16)     Lactate, Blood: 1.5 mmol/L (11.17.21 @ 18:32)   Lactate, Blood: 2.5 mmol/L (11.17.21 @ 05:05)     Blood Gas Venous - Lactate: 4.60: TYPE:(C=Critical, N=Notification, A=Abnormal) c   TESTS: _lactatewbv     DATE/TIME CALLED:11/16/2021 13:11:50 EST _ Culture - Blood (11.16.21 @ 10:26)   - Escherichia coli: Detec   Gram Stain:   Growth in aerobic and anaerobic bottles: Gram Negative Rods   ***Blood Panel PCR results on this specimen are available   approximately 3 hours after the Gram stain result.***   Gram stain, PCR, and/or culture results may not always   correspond due todifference in methodologies.   ************************************************************   This PCR assay was performed using [employer].   The following targets are tested for: Enterococcus,   vancomycin resistant enterococci, Listeria monocytogenes,   coagulase negative staphylococci, S. aureus,   methicillin resistant S. aureus, Streptococcus agalactiae   (Group B), S. pneumoniae, S. pyogenes (Group A),   Acinetobacter baumannii, Enterobacter cloacae, E. coli,   Klebsiella oxytoca, K. pneumoniae, Proteus sp.,   Serratia marcescens, Haemophilus influenzae,   Neisseria meningitidis, Pseudomonas aeruginosa, Candida   albicans, C. glabrata, C krusei, C parapsilosis,   C. tropicalis and the KPC resistance gene.   "Due to technical problems, Pseudomonas aeruginosa   until further notice".   Gram Stain and BCID performed by:   [facility]   [address] [place] [address]   .   TYPE: (C=Critical, N=Notification, A=Abnormal) C   TESTS: _ GS   DATE/TIME CALLED: _ 11/17/2021 08:33:45   CALLED TO: Sonny Jacobs RN   READ BACK (2 Patient Identifiers)(Y/N): _ Y   READ BACK VALUES (Y/N): _ Y   CALLED BY: Sonny Yuen   - Amikacin: S <=16   - Ampicillin: S <=8 These ampicillin results predict results for amoxicillin   - Ampicillin/Sulbactam: S <=4/2 Enterobacter, Klebsiella aerogenes, Citrobacter, and Serratia may develop resistance during prolonged therapy (3-4 days)   - Aztreonam: S <=4   - Cefazolin: S <=2 Enterobacter, Klebsiella aerogenes, Citrobacter, and Serratia may develop resistance during prolonged therapy (3-4 days)   - Cefepime: S <=2   - Cefoxitin: S <=8   - Ceftriaxone: S <=1 Enterobacter, Klebsiella aerogenes, Citrobacter, and Serratia may develop resistance during prolonged therapy   - Ciprofloxacin: S <=0.25   - Ertapenem: S <=0.5   - Gentamicin: S <=2   - Imipenem: S <=1   - Levofloxacin: S <=0.5   - Meropenem: S <=1   - Piperacillin/Tazobactam: S <=8   - Tobramycin: S <=2   - Trimethoprim/Sulfamethoxazole: S <=0.5/9.5   Specimen Source: .Blood Blood-Peripheral   Organism: Blood Culture PCR   Organism: Escherichia coli   Culture Results:   Growth in aerobic and anaerobic bottles: Escherichia coli   Organism Identification: Blood Culture PCR   Escherichia coli   Method Type: RANGEL   Method Type: PCR     Progress Note Adult-SICU Nurse Practitioner/Attending [Charted Location: [facility] 3E 3113 01] [Authored: 18-Nov-2021 06:18]  24h Events:  recieved 1L bolus this AM for high variability on POCUS IVC exam. + BM, passing flatus throughout the day. NGT clamp trial with 210 output. NGT back to suction overnight. BCX resulted + for E. coli in both bottles. Urine culture sent as pt with + UA in ED. Ucx result negative. UA repeated overnight, now negative. Started on zosyn for treatment of E. coli bacteremia, source unclear. Lactate cleared. Vasopressin weaned off, levophed requirement < 0.1 mcg/kg/hr. UOP improving, approx 100 ml/hr.     Discharge Note Provider [Charted Location: [facility] 2G 1785 01] [Authored: 21-Nov-2021 12:33]- for Visit: [id number], Complete, Not Revised, Signed in Full, General   Hospital Course:  Discharge Date	21-Nov-2021  Admission Date	16-Nov-2021 16:50  Hospital Course	  86y Female present to ED with abdominal pain nausea and vomiting which started yesterday after eating tomato soup, this morning felt anxious and febrile reason why she came. she denies flatus or bowel movements since yesterday pain is diffuse. 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 CT findings consistent with SBO, also found to be in afib, she was admitted to the surgery service to the ICU for resuscitation.  A NGT was placed for bowel decompression. Rest of course was uneventful, she started to have bowel function, the NGT was clamped and removed after she passed a clamp trial. She was adequately resuscitated in ICU. She was downgraded from ICU to floor. She was found to have ecoli bacteremia, was treated with IV antibiotics then switched to oral. Blood cultures cleared. Source of bacteremia likely from SBO which resolved with non op management. She remained on low dose metoprolol (home med) while in the hospital, as she was hypotensive during the beginning of her visit, but her home dose of metoprolol was weaned up. Upon discharge, pain well controlled, tolerating diet, ambulating and voiding independently, Stable for discharge home per attending with outpatient follow up.

## 2022-10-02 ENCOUNTER — INPATIENT (INPATIENT)
Facility: HOSPITAL | Age: 87
LOS: 2 days | Discharge: HOME | DRG: 389 | End: 2022-10-05
Attending: STUDENT IN AN ORGANIZED HEALTH CARE EDUCATION/TRAINING PROGRAM | Admitting: STUDENT IN AN ORGANIZED HEALTH CARE EDUCATION/TRAINING PROGRAM
Payer: MEDICARE

## 2022-10-02 VITALS
TEMPERATURE: 98 F | RESPIRATION RATE: 18 BRPM | DIASTOLIC BLOOD PRESSURE: 71 MMHG | HEART RATE: 109 BPM | WEIGHT: 177.03 LBS | HEIGHT: 71 IN | SYSTOLIC BLOOD PRESSURE: 98 MMHG | OXYGEN SATURATION: 99 %

## 2022-10-02 DIAGNOSIS — Z90.49 ACQUIRED ABSENCE OF OTHER SPECIFIED PARTS OF DIGESTIVE TRACT: Chronic | ICD-10-CM

## 2022-10-02 LAB
ALBUMIN SERPL ELPH-MCNC: 4.7 G/DL — SIGNIFICANT CHANGE UP (ref 3.3–5.2)
ALP SERPL-CCNC: 80 U/L — SIGNIFICANT CHANGE UP (ref 40–120)
ALT FLD-CCNC: 16 U/L — SIGNIFICANT CHANGE UP
ANION GAP SERPL CALC-SCNC: 22 MMOL/L — HIGH (ref 5–17)
AST SERPL-CCNC: 23 U/L — SIGNIFICANT CHANGE UP
BASOPHILS # BLD AUTO: 0.1 K/UL — SIGNIFICANT CHANGE UP (ref 0–0.2)
BASOPHILS NFR BLD AUTO: 1 % — SIGNIFICANT CHANGE UP (ref 0–2)
BILIRUB SERPL-MCNC: 1.1 MG/DL — SIGNIFICANT CHANGE UP (ref 0.4–2)
BLD GP AB SCN SERPL QL: SIGNIFICANT CHANGE UP
BUN SERPL-MCNC: 45.9 MG/DL — HIGH (ref 8–20)
CALCIUM SERPL-MCNC: 10.1 MG/DL — SIGNIFICANT CHANGE UP (ref 8.4–10.5)
CHLORIDE SERPL-SCNC: 93 MMOL/L — LOW (ref 98–107)
CO2 SERPL-SCNC: 25 MMOL/L — SIGNIFICANT CHANGE UP (ref 22–29)
CREAT SERPL-MCNC: 2.07 MG/DL — HIGH (ref 0.5–1.3)
EGFR: 23 ML/MIN/1.73M2 — LOW
EOSINOPHIL # BLD AUTO: 0.09 K/UL — SIGNIFICANT CHANGE UP (ref 0–0.5)
EOSINOPHIL NFR BLD AUTO: 0.9 % — SIGNIFICANT CHANGE UP (ref 0–6)
GLUCOSE SERPL-MCNC: 176 MG/DL — HIGH (ref 70–99)
HCT VFR BLD CALC: 51.5 % — HIGH (ref 34.5–45)
HGB BLD-MCNC: 16.7 G/DL — HIGH (ref 11.5–15.5)
IMM GRANULOCYTES NFR BLD AUTO: 0.4 % — SIGNIFICANT CHANGE UP (ref 0–0.9)
LACTATE BLDV-MCNC: 4.3 MMOL/L — CRITICAL HIGH (ref 0.5–2)
LACTATE SERPL-SCNC: 2.7 MMOL/L — HIGH (ref 0.5–2)
LIDOCAIN IGE QN: 16 U/L — LOW (ref 22–51)
LYMPHOCYTES # BLD AUTO: 0.58 K/UL — LOW (ref 1–3.3)
LYMPHOCYTES # BLD AUTO: 6 % — LOW (ref 13–44)
MCHC RBC-ENTMCNC: 26.6 PG — LOW (ref 27–34)
MCHC RBC-ENTMCNC: 32.4 GM/DL — SIGNIFICANT CHANGE UP (ref 32–36)
MCV RBC AUTO: 82 FL — SIGNIFICANT CHANGE UP (ref 80–100)
MONOCYTES # BLD AUTO: 1.23 K/UL — HIGH (ref 0–0.9)
MONOCYTES NFR BLD AUTO: 12.8 % — SIGNIFICANT CHANGE UP (ref 2–14)
NEUTROPHILS # BLD AUTO: 7.58 K/UL — HIGH (ref 1.8–7.4)
NEUTROPHILS NFR BLD AUTO: 78.9 % — HIGH (ref 43–77)
PLATELET # BLD AUTO: 324 K/UL — SIGNIFICANT CHANGE UP (ref 150–400)
POTASSIUM SERPL-MCNC: 3.8 MMOL/L — SIGNIFICANT CHANGE UP (ref 3.5–5.3)
POTASSIUM SERPL-SCNC: 3.8 MMOL/L — SIGNIFICANT CHANGE UP (ref 3.5–5.3)
PROT SERPL-MCNC: 7.9 G/DL — SIGNIFICANT CHANGE UP (ref 6.6–8.7)
RBC # BLD: 6.28 M/UL — HIGH (ref 3.8–5.2)
RBC # FLD: 18.6 % — HIGH (ref 10.3–14.5)
SODIUM SERPL-SCNC: 139 MMOL/L — SIGNIFICANT CHANGE UP (ref 135–145)
WBC # BLD: 9.62 K/UL — SIGNIFICANT CHANGE UP (ref 3.8–10.5)
WBC # FLD AUTO: 9.62 K/UL — SIGNIFICANT CHANGE UP (ref 3.8–10.5)

## 2022-10-02 PROCEDURE — 71045 X-RAY EXAM CHEST 1 VIEW: CPT | Mod: 26,76

## 2022-10-02 PROCEDURE — 99221 1ST HOSP IP/OBS SF/LOW 40: CPT

## 2022-10-02 PROCEDURE — 93010 ELECTROCARDIOGRAM REPORT: CPT

## 2022-10-02 PROCEDURE — 99285 EMERGENCY DEPT VISIT HI MDM: CPT

## 2022-10-02 PROCEDURE — 74176 CT ABD & PELVIS W/O CONTRAST: CPT | Mod: 26,MA

## 2022-10-02 RX ORDER — MORPHINE SULFATE 50 MG/1
4 CAPSULE, EXTENDED RELEASE ORAL ONCE
Refills: 0 | Status: DISCONTINUED | OUTPATIENT
Start: 2022-10-02 | End: 2022-10-02

## 2022-10-02 RX ORDER — SODIUM CHLORIDE 9 MG/ML
1000 INJECTION, SOLUTION INTRAVENOUS ONCE
Refills: 0 | Status: COMPLETED | OUTPATIENT
Start: 2022-10-02 | End: 2022-10-02

## 2022-10-02 RX ORDER — SODIUM CHLORIDE 9 MG/ML
1000 INJECTION INTRAMUSCULAR; INTRAVENOUS; SUBCUTANEOUS ONCE
Refills: 0 | Status: COMPLETED | OUTPATIENT
Start: 2022-10-02 | End: 2022-10-02

## 2022-10-02 RX ORDER — ONDANSETRON 8 MG/1
4 TABLET, FILM COATED ORAL ONCE
Refills: 0 | Status: COMPLETED | OUTPATIENT
Start: 2022-10-02 | End: 2022-10-02

## 2022-10-02 RX ORDER — MAGNESIUM SULFATE 500 MG/ML
2 VIAL (ML) INJECTION ONCE
Refills: 0 | Status: COMPLETED | OUTPATIENT
Start: 2022-10-02 | End: 2022-10-02

## 2022-10-02 RX ORDER — SODIUM CHLORIDE 9 MG/ML
500 INJECTION, SOLUTION INTRAVENOUS ONCE
Refills: 0 | Status: COMPLETED | OUTPATIENT
Start: 2022-10-02 | End: 2022-10-02

## 2022-10-02 RX ORDER — PANTOPRAZOLE SODIUM 20 MG/1
40 TABLET, DELAYED RELEASE ORAL DAILY
Refills: 0 | Status: DISCONTINUED | OUTPATIENT
Start: 2022-10-02 | End: 2022-10-05

## 2022-10-02 RX ADMIN — Medication 50 GRAM(S): at 23:44

## 2022-10-02 RX ADMIN — ONDANSETRON 4 MILLIGRAM(S): 8 TABLET, FILM COATED ORAL at 18:57

## 2022-10-02 RX ADMIN — SODIUM CHLORIDE 1000 MILLILITER(S): 9 INJECTION INTRAMUSCULAR; INTRAVENOUS; SUBCUTANEOUS at 19:03

## 2022-10-02 RX ADMIN — MORPHINE SULFATE 4 MILLIGRAM(S): 50 CAPSULE, EXTENDED RELEASE ORAL at 18:57

## 2022-10-02 RX ADMIN — SODIUM CHLORIDE 1000 MILLILITER(S): 9 INJECTION, SOLUTION INTRAVENOUS at 21:21

## 2022-10-02 RX ADMIN — MORPHINE SULFATE 4 MILLIGRAM(S): 50 CAPSULE, EXTENDED RELEASE ORAL at 19:27

## 2022-10-02 RX ADMIN — SODIUM CHLORIDE 500 MILLILITER(S): 9 INJECTION, SOLUTION INTRAVENOUS at 23:44

## 2022-10-02 RX ADMIN — SODIUM CHLORIDE 1000 MILLILITER(S): 9 INJECTION INTRAMUSCULAR; INTRAVENOUS; SUBCUTANEOUS at 19:35

## 2022-10-02 NOTE — ED ADULT NURSE NOTE - CHIEF COMPLAINT QUOTE
dgtr states mother has been vomiting since 10 pm last night, unable to keep anything down,   A&Ox3, resp wnl, HX Obstruction

## 2022-10-02 NOTE — ED PROVIDER NOTE - NSICDXPASTMEDICALHX_GEN_ALL_CORE_FT
PAST MEDICAL HISTORY:  Afib     CAD (coronary artery disease)     HLD (hyperlipidemia)     Hypothyroidism

## 2022-10-02 NOTE — ED PROVIDER NOTE - CARE PLAN
Principal Discharge DX:	Small bowel obstruction  Secondary Diagnosis:	Atrial fibrillation with RVR   1

## 2022-10-02 NOTE — ED ADULT NURSE NOTE - OBJECTIVE STATEMENT
AAOx3 c/o nauea and vomiting x 10PM 10/1/2022. Denies trauma, fall, CP, SOB, HA or dizziness. Patient appears well.

## 2022-10-02 NOTE — ED ADULT NURSE REASSESSMENT NOTE - NS ED NURSE REASSESS COMMENT FT1
Assumed care of pt at this time, POC reviewed with previous RN. Pt stated she had chicken soup for dinner last night and has been vomiting since for about 24 hours.  Pt AOx4, speaking coherently, respirations even and unlabored on RA, skin warm and dry, pt in rapid afib in the 140s, Dr. Le at bedside for eval and to obtain a second line and labs. CM and  in place, bed locked in the lowest position side rails up.

## 2022-10-02 NOTE — ED PROVIDER NOTE - CLINICAL SUMMARY MEDICAL DECISION MAKING FREE TEXT BOX
87yF with pmh bowel obstruction, afib on Eliquis, CAD, HTN presenting with 1 day of obstipation and vomiting. Symptoms c/w sbo. Lactate, CBC, CMP, coags, and type and screen ordered. CT abd pel with contrast ordered. Afib RVR likely compensatory for SBO. Patient reports being due for metoprolol 100mg at night. Will monitor as patient is resuscitated with crystalloid. 87yF with pmh bowel obstruction, afib on Eliquis, CAD, HTN presenting with 1 day of obstipation and vomiting. Symptoms c/w sbo. Lactate, CBC, CMP, coags, and type and screen ordered. CT abd pel with contrast ordered. Afib RVR likely compensatory for SBO. Patient reports being due for metoprolol 100mg at night. Will monitor for improvement as patient is resuscitated with crystalloid. Spoke to Embedded Chat. 87yF with pmh bowel obstruction, afib on Eliquis, CAD, HTN presenting with 1 day of obstipation and vomiting. Symptoms c/w sbo. Lactate, CBC, CMP, coags, and type and screen ordered. CT abd pel with contrast ordered. Afib RVR likely compensatory for SBO. Patient reports being due for metoprolol 100mg at night. Will monitor for improvement as patient is resuscitated with crystalloid. Both daughter and patient report DNR/DNI is in place. MOLST completed and placed in patient's chart.

## 2022-10-02 NOTE — ED PROVIDER NOTE - PHYSICAL EXAMINATION
Gen: no acute distress  Head: normocephalic, atraumatic  EENT: EOMI  Lung: no increased work of breathing, CTABL  CV: normal s1/s2, rrr, 2+ radial pulses b/l, no LE edema  Abd: moderately distended, TTP LLQ; +tympanic RLQ; no rebound or guarding.   MSK: no visible deformities, full range of motion in all 4 extremities  Neuro: A&Ox4; No focal neurologic deficits

## 2022-10-02 NOTE — ED ADULT NURSE REASSESSMENT NOTE - NS ED NURSE REASSESS COMMENT FT1
Patient -160bpm. MD Le made aware. IV placed, fluids running. Awaiting further orders. No distress stated by patient.

## 2022-10-02 NOTE — ED PROVIDER NOTE - PROGRESS NOTE DETAILS
Given the significant and immediate threats to this patient based on initial presentation, the benefits of emergency contrast-enhanced CT imaging without obtaining GFR/creatinine serum level results greatly outweigh the potential risk of harm due to contrast-induced nephropathy.  Patient being aggressively resuscitated with crystalloid Shaina Le MD PGY-2 CT with SBO. NGT placed by surgery with ~1500cc output. Patient s/p 3L crystalloid. HR improved from 160s to 110s-130s. Patient satting well on RA. Will give 2g mag Shaina Le MD PGY-2

## 2022-10-02 NOTE — ED PROVIDER NOTE - OBJECTIVE STATEMENT
87yF with previous history of obstruction presenting with bilious vomiting and abd pain. Patient in rapid afib RVR with +lactate. Will send to CT scan for CT abd/pel 87yF with pmh bowel obstruction, afib on Eliquis, CAD, HTN presenting with 1 day of obstipation and vomiting. Patient reports bilious vomiting and inability to tolerate PO for the past day. No BM or flatus over past day as well. She reports having a bowel obstruction ~10 years ago. Underwent surgery at [place] with distal ileocecal resection and ileostomy that was eventually reversed. She then had an SBO in 2021 complicated by septic shock and SICU stay. No surgery was done at the time and she eventually improved. Patient found to be in rapid afib on the monitor in the 160s.

## 2022-10-03 DIAGNOSIS — K56.609 UNSPECIFIED INTESTINAL OBSTRUCTION, UNSPECIFIED AS TO PARTIAL VERSUS COMPLETE OBSTRUCTION: ICD-10-CM

## 2022-10-03 LAB
ANION GAP SERPL CALC-SCNC: 15 MMOL/L — SIGNIFICANT CHANGE UP (ref 5–17)
BASOPHILS # BLD AUTO: 0.07 K/UL — SIGNIFICANT CHANGE UP (ref 0–0.2)
BASOPHILS NFR BLD AUTO: 0.9 % — SIGNIFICANT CHANGE UP (ref 0–2)
BUN SERPL-MCNC: 42.6 MG/DL — HIGH (ref 8–20)
CALCIUM SERPL-MCNC: 9.1 MG/DL — SIGNIFICANT CHANGE UP (ref 8.4–10.5)
CHLORIDE SERPL-SCNC: 97 MMOL/L — LOW (ref 98–107)
CO2 SERPL-SCNC: 27 MMOL/L — SIGNIFICANT CHANGE UP (ref 22–29)
CREAT SERPL-MCNC: 1.22 MG/DL — SIGNIFICANT CHANGE UP (ref 0.5–1.3)
EGFR: 43 ML/MIN/1.73M2 — LOW
EOSINOPHIL # BLD AUTO: 0.22 K/UL — SIGNIFICANT CHANGE UP (ref 0–0.5)
EOSINOPHIL NFR BLD AUTO: 2.8 % — SIGNIFICANT CHANGE UP (ref 0–6)
GLUCOSE SERPL-MCNC: 140 MG/DL — HIGH (ref 70–99)
HCT VFR BLD CALC: 47.3 % — HIGH (ref 34.5–45)
HGB BLD-MCNC: 15.2 G/DL — SIGNIFICANT CHANGE UP (ref 11.5–15.5)
IMM GRANULOCYTES NFR BLD AUTO: 0.3 % — SIGNIFICANT CHANGE UP (ref 0–0.9)
LACTATE SERPL-SCNC: 2 MMOL/L — SIGNIFICANT CHANGE UP (ref 0.5–2)
LYMPHOCYTES # BLD AUTO: 0.83 K/UL — LOW (ref 1–3.3)
LYMPHOCYTES # BLD AUTO: 10.7 % — LOW (ref 13–44)
MAGNESIUM SERPL-MCNC: 2.2 MG/DL — SIGNIFICANT CHANGE UP (ref 1.6–2.6)
MCHC RBC-ENTMCNC: 26.9 PG — LOW (ref 27–34)
MCHC RBC-ENTMCNC: 32.1 GM/DL — SIGNIFICANT CHANGE UP (ref 32–36)
MCV RBC AUTO: 83.7 FL — SIGNIFICANT CHANGE UP (ref 80–100)
MONOCYTES # BLD AUTO: 1.1 K/UL — HIGH (ref 0–0.9)
MONOCYTES NFR BLD AUTO: 14.2 % — HIGH (ref 2–14)
NEUTROPHILS # BLD AUTO: 5.49 K/UL — SIGNIFICANT CHANGE UP (ref 1.8–7.4)
NEUTROPHILS NFR BLD AUTO: 71.1 % — SIGNIFICANT CHANGE UP (ref 43–77)
PHOSPHATE SERPL-MCNC: 3.5 MG/DL — SIGNIFICANT CHANGE UP (ref 2.4–4.7)
PLATELET # BLD AUTO: 246 K/UL — SIGNIFICANT CHANGE UP (ref 150–400)
POTASSIUM SERPL-MCNC: 3.6 MMOL/L — SIGNIFICANT CHANGE UP (ref 3.5–5.3)
POTASSIUM SERPL-SCNC: 3.6 MMOL/L — SIGNIFICANT CHANGE UP (ref 3.5–5.3)
RBC # BLD: 5.65 M/UL — HIGH (ref 3.8–5.2)
RBC # FLD: 18 % — HIGH (ref 10.3–14.5)
SARS-COV-2 RNA SPEC QL NAA+PROBE: SIGNIFICANT CHANGE UP
SODIUM SERPL-SCNC: 139 MMOL/L — SIGNIFICANT CHANGE UP (ref 135–145)
WBC # BLD: 7.73 K/UL — SIGNIFICANT CHANGE UP (ref 3.8–10.5)
WBC # FLD AUTO: 7.73 K/UL — SIGNIFICANT CHANGE UP (ref 3.8–10.5)

## 2022-10-03 PROCEDURE — 93010 ELECTROCARDIOGRAM REPORT: CPT

## 2022-10-03 RX ORDER — LEVOTHYROXINE SODIUM 125 MCG
25 TABLET ORAL AT BEDTIME
Refills: 0 | Status: DISCONTINUED | OUTPATIENT
Start: 2022-10-03 | End: 2022-10-05

## 2022-10-03 RX ORDER — METOPROLOL TARTRATE 50 MG
5 TABLET ORAL ONCE
Refills: 0 | Status: COMPLETED | OUTPATIENT
Start: 2022-10-03 | End: 2022-10-03

## 2022-10-03 RX ORDER — SODIUM CHLORIDE 9 MG/ML
1000 INJECTION, SOLUTION INTRAVENOUS ONCE
Refills: 0 | Status: COMPLETED | OUTPATIENT
Start: 2022-10-03 | End: 2022-10-03

## 2022-10-03 RX ORDER — ACETAMINOPHEN 500 MG
1000 TABLET ORAL ONCE
Refills: 0 | Status: COMPLETED | OUTPATIENT
Start: 2022-10-03 | End: 2022-10-03

## 2022-10-03 RX ORDER — SODIUM CHLORIDE 9 MG/ML
1000 INJECTION, SOLUTION INTRAVENOUS
Refills: 0 | Status: DISCONTINUED | OUTPATIENT
Start: 2022-10-03 | End: 2022-10-03

## 2022-10-03 RX ORDER — CHOLECALCIFEROL (VITAMIN D3) 125 MCG
1 CAPSULE ORAL
Qty: 0 | Refills: 0 | DISCHARGE

## 2022-10-03 RX ORDER — POTASSIUM CHLORIDE 20 MEQ
10 PACKET (EA) ORAL
Refills: 0 | Status: COMPLETED | OUTPATIENT
Start: 2022-10-03 | End: 2022-10-03

## 2022-10-03 RX ORDER — LEVOTHYROXINE SODIUM 125 MCG
50 TABLET ORAL DAILY
Refills: 0 | Status: DISCONTINUED | OUTPATIENT
Start: 2022-10-03 | End: 2022-10-03

## 2022-10-03 RX ORDER — ONDANSETRON 8 MG/1
4 TABLET, FILM COATED ORAL EVERY 4 HOURS
Refills: 0 | Status: DISCONTINUED | OUTPATIENT
Start: 2022-10-03 | End: 2022-10-03

## 2022-10-03 RX ORDER — METOPROLOL TARTRATE 50 MG
100 TABLET ORAL DAILY
Refills: 0 | Status: DISCONTINUED | OUTPATIENT
Start: 2022-10-03 | End: 2022-10-05

## 2022-10-03 RX ORDER — HYDROMORPHONE HYDROCHLORIDE 2 MG/ML
1 INJECTION INTRAMUSCULAR; INTRAVENOUS; SUBCUTANEOUS ONCE
Refills: 0 | Status: DISCONTINUED | OUTPATIENT
Start: 2022-10-03 | End: 2022-10-03

## 2022-10-03 RX ORDER — HEPARIN SODIUM 5000 [USP'U]/ML
5000 INJECTION INTRAVENOUS; SUBCUTANEOUS EVERY 8 HOURS
Refills: 0 | Status: DISCONTINUED | OUTPATIENT
Start: 2022-10-03 | End: 2022-10-05

## 2022-10-03 RX ORDER — LANOLIN ALCOHOL/MO/W.PET/CERES
5 CREAM (GRAM) TOPICAL AT BEDTIME
Refills: 0 | Status: DISCONTINUED | OUTPATIENT
Start: 2022-10-03 | End: 2022-10-05

## 2022-10-03 RX ORDER — METOPROLOL TARTRATE 50 MG
25 TABLET ORAL ONCE
Refills: 0 | Status: DISCONTINUED | OUTPATIENT
Start: 2022-10-03 | End: 2022-10-03

## 2022-10-03 RX ORDER — SODIUM CHLORIDE 9 MG/ML
1000 INJECTION, SOLUTION INTRAVENOUS
Refills: 0 | Status: DISCONTINUED | OUTPATIENT
Start: 2022-10-03 | End: 2022-10-05

## 2022-10-03 RX ORDER — METOPROLOL TARTRATE 50 MG
5 TABLET ORAL ONCE
Refills: 0 | Status: DISCONTINUED | OUTPATIENT
Start: 2022-10-03 | End: 2022-10-04

## 2022-10-03 RX ADMIN — Medication 25 MICROGRAM(S): at 22:35

## 2022-10-03 RX ADMIN — SODIUM CHLORIDE 150 MILLILITER(S): 9 INJECTION, SOLUTION INTRAVENOUS at 01:37

## 2022-10-03 RX ADMIN — Medication 1000 MILLIGRAM(S): at 19:00

## 2022-10-03 RX ADMIN — Medication 5 MILLIGRAM(S): at 07:57

## 2022-10-03 RX ADMIN — HEPARIN SODIUM 5000 UNIT(S): 5000 INJECTION INTRAVENOUS; SUBCUTANEOUS at 19:06

## 2022-10-03 RX ADMIN — Medication 100 MILLIEQUIVALENT(S): at 07:57

## 2022-10-03 RX ADMIN — Medication 5 MILLIGRAM(S): at 22:21

## 2022-10-03 RX ADMIN — HEPARIN SODIUM 5000 UNIT(S): 5000 INJECTION INTRAVENOUS; SUBCUTANEOUS at 01:14

## 2022-10-03 RX ADMIN — Medication 400 MILLIGRAM(S): at 18:30

## 2022-10-03 RX ADMIN — HYDROMORPHONE HYDROCHLORIDE 1 MILLIGRAM(S): 2 INJECTION INTRAMUSCULAR; INTRAVENOUS; SUBCUTANEOUS at 14:29

## 2022-10-03 RX ADMIN — PANTOPRAZOLE SODIUM 40 MILLIGRAM(S): 20 TABLET, DELAYED RELEASE ORAL at 12:28

## 2022-10-03 RX ADMIN — SODIUM CHLORIDE 150 MILLILITER(S): 9 INJECTION, SOLUTION INTRAVENOUS at 04:48

## 2022-10-03 RX ADMIN — HEPARIN SODIUM 5000 UNIT(S): 5000 INJECTION INTRAVENOUS; SUBCUTANEOUS at 10:41

## 2022-10-03 RX ADMIN — HYDROMORPHONE HYDROCHLORIDE 1 MILLIGRAM(S): 2 INJECTION INTRAMUSCULAR; INTRAVENOUS; SUBCUTANEOUS at 14:59

## 2022-10-03 RX ADMIN — SODIUM CHLORIDE 1000 MILLILITER(S): 9 INJECTION, SOLUTION INTRAVENOUS at 01:14

## 2022-10-03 RX ADMIN — Medication 100 MILLIGRAM(S): at 19:06

## 2022-10-03 RX ADMIN — SODIUM CHLORIDE 150 MILLILITER(S): 9 INJECTION, SOLUTION INTRAVENOUS at 18:28

## 2022-10-03 RX ADMIN — Medication 100 MILLIEQUIVALENT(S): at 09:22

## 2022-10-03 RX ADMIN — Medication 100 MILLIEQUIVALENT(S): at 10:39

## 2022-10-03 NOTE — ED ADULT NURSE REASSESSMENT NOTE - NS ED NURSE REASSESS COMMENT FT1
Pt is A&OX4, speaking coherently, respirations even and unlabored on RA, skin warm and dry, NG tube in place, pt is being transferred to CDU Bed 15L and report given to Kenji Babcock RN, pts current condition, medical history and reason for admission reviewed, IV site is clean/dry/intact, pt is not in any pain or distress at this time, pt is aware of the transfer, the need for the transfer and acclimated to the new unit, provided the call button, personal items within reach, bed is in lowest position, wheels are locked, pt VS recorded and placed in the EMR, pt education deemed successful after teach back shows proficiency. Pt is A&OX4, speaking coherently, respirations even and unlabored on RA, skin warm and dry, NG tube in place, pt is being transferred to CDU Bed 15L and report given to Kenji Babcock RN, pts current condition, medical history and reason for admission reviewed, IV site is clean/dry/intact, pt is not in any pain or distress at this time, pt is aware of the transfer, the need for the transfer and acclimated to the new unit, provided the call button, personal items within reach, bed is in lowest position, wheels are locked, pt VS recorded and placed in the EMR, pt education deemed successful after teach back shows proficiency. Pt remains in afib rate in 120s-130s, MD aware, no new orders at this time.

## 2022-10-03 NOTE — H&P ADULT - NSHPPHYSICALEXAM_GEN_ALL_CORE
VITALS & I/Os:  Vital Signs Last 24 Hrs  T(C): 36.7 (03 Oct 2022 00:06), Max: 36.7 (03 Oct 2022 00:06)  T(F): 98 (03 Oct 2022 00:06), Max: 98 (03 Oct 2022 00:06)  HR: 123 (03 Oct 2022 00:06) (109 - 123)  BP: 113/74 (03 Oct 2022 00:06) (98/71 - 113/74)  BP(mean): --  RR: 18 (03 Oct 2022 00:06) (18 - 18)  SpO2: 93% (03 Oct 2022 00:06) (93% - 99%)    Parameters below as of 03 Oct 2022 00:06  Patient On (Oxygen Delivery Method): room air        I&O's Summary        PHYSICAL EXAM:  Constitutional: patient resting comfortably in bed, in no acute distress  Neck: No JVD, full ROM without pain  Respiratory: respirations are unlabored, no accessory muscle use, no conversational dyspnea  Cardiovascular: afib, on tele   Gastrointestinal: Abdomen soft, distended, mild tenderness non-reproducible,  no rebound tenderness / guarding, midline laparotomy scar   Neurological: GCS: 15 (4/5/6). A&O x 3; no gross sensory / motor / coordination deficits

## 2022-10-03 NOTE — H&P ADULT - ATTENDING COMMENTS
I have seen and examined this patient with the resident.  I agree with the above-documented, along with the following:  This is an 87-year-old female with PMHx HTN, CAD, HLD, Hypothyroidism, and PSHx right hemicolectomy with end ileostomy followed by planned takedown and anastomosis.  She presents to the [facility] ED with abdominal pain, nausea, and vomiting.  She reports that she was at [facility] for 2 years with a perforated right colon and subsequent infection approximately 10 years ago.  She has been admitted multiple times for SBO, and returns to our ED with similar symptoms.  An NGT was placed and 500cc output has returned.  For now, given her history of multiple SBOs treated non-operatively, she will be treated non-operatively with NGT, NPO, IVF, and serial abdominal exams.

## 2022-10-03 NOTE — H&P ADULT - NSHPLABSRESULTS_GEN_ALL_CORE
LABS:                        16.7   9.62  )-----------( 324      ( 02 Oct 2022 19:28 )             51.5     10-02    139  |  93<L>  |  45.9<H>  ----------------------------<  176<H>  3.8   |  25.0  |  2.07<H>    Ca    10.1      02 Oct 2022 19:28    TPro  7.9  /  Alb  4.7  /  TBili  1.1  /  DBili  x   /  AST  23  /  ALT  16  /  AlkPhos  80  10-02    Lactate: Lactate, Blood: 2.7 mmol/L (10-02 @ 22:45)   10-02 @ 19:28  4.30        IMAGING:  < from: CT Abdomen and Pelvis No Cont (10.02.22 @ 20:45) >    FINDINGS:  LOWER CHEST: Mild fluid in the distal esophagus along with a small hiatal   hernia; recommend aspiration precautions. Coronary artery calcifications.   A stable 7 mm left lower lobe juxtapleural nodule.    LIVER: Scattered calcified granulomas.  BILE DUCTS: Normal caliber.  GALLBLADDER: Partial cholecystectomy.  SPLEEN: Within normal limits.  PANCREAS: Within normal limits.  ADRENALS: Within normal limits.  KIDNEYS/URETERS: Right renal cysts. No renal stones or hydronephrosis..    BLADDER: Within normal limits.  REPRODUCTIVE ORGANS: Hysterectomy.    BOWEL: Fluid-filled distended stomach and small bowel with transition at   the level of the ileocolic anastomosis. Short segment of small bowel at   the anastomosis demonstrates wall thickening. No pneumatosis. Colonic   diverticulosis. PERITONEUM: No free air, fluid collection, or ascites.  VESSELS: No adriana-mesenteric gas. Atherosclerotic calcifications. Stable   calcified 2 cm splenic artery aneurysm..  RETROPERITONEUM/LYMPH NODES: No lymphadenopathy.  ABDOMINAL WALL: Ventral hernia repair with mesh.  BONES: Degenerative changes.      IMPRESSION:  Small bowel obstruction with transition at the level of the ileocolic   anastomosis. There is short segment bowel wall thickening at the  anastomosis. Lactate level correlation is recommended to exclude bowel   ischemia. No Adriana mesenteric gas or pneumatosis.  VERTEBRAL BODY ANALYSIS: No Vertebral fracture or low bone density   identified.

## 2022-10-03 NOTE — H&P ADULT - HISTORY OF PRESENT ILLNESS
ACS Surgery H&P          HPI:        PAST MEDICAL HISTORY:  CAD (coronary artery disease)    HLD (hyperlipidemia)    Afib    Hypothyroidism          PAST SURGICAL HISTORY:  S/P colon resection          MEDICATIONS:  heparin   Injectable 5000 Unit(s) SubCutaneous every 8 hours  lactated ringers Bolus 1000 milliLiter(s) IV Bolus once  lactated ringers. 1000 milliLiter(s) IV Continuous <Continuous>  levothyroxine 50 MICROGram(s) Oral daily  pantoprazole  Injectable 40 milliGRAM(s) IV Push daily        ALLERGIES:  No Known Allergies        VITALS & I/Os:  Vital Signs Last 24 Hrs  T(C): 36.7 (03 Oct 2022 00:06), Max: 36.7 (03 Oct 2022 00:06)  T(F): 98 (03 Oct 2022 00:06), Max: 98 (03 Oct 2022 00:06)  HR: 123 (03 Oct 2022 00:06) (109 - 123)  BP: 113/74 (03 Oct 2022 00:06) (98/71 - 113/74)  BP(mean): --  RR: 18 (03 Oct 2022 00:06) (18 - 18)  SpO2: 93% (03 Oct 2022 00:06) (93% - 99%)    Parameters below as of 03 Oct 2022 00:06  Patient On (Oxygen Delivery Method): room air        I&O's Summary        PHYSICAL EXAM:  Constitutional: patient resting comfortably in bed, in no acute distress  HEENT: EOMI / PERRL b/l, no active drainage or redness  Neck: No JVD, full ROM without pain  Respiratory: respirations are unlabored, no accessory muscle use, no conversational dyspnea  Cardiovascular: regular rate & rhythm  Gastrointestinal: Abdomen soft, non-tender, non-distended, no rebound tenderness / guarding  Neurological: GCS: 15 (4/5/6). A&O x 3; no gross sensory / motor / coordination deficits  Psychiatric: Normal mood, normal affect  Musculoskeletal: No joint pain, swelling or deformity; no limitation of movement  Vascular:        LABS:                        16.7   9.62  )-----------( 324      ( 02 Oct 2022 19:28 )             51.5     10-02    139  |  93<L>  |  45.9<H>  ----------------------------<  176<H>  3.8   |  25.0  |  2.07<H>    Ca    10.1      02 Oct 2022 19:28    TPro  7.9  /  Alb  4.7  /  TBili  1.1  /  DBili  x   /  AST  23  /  ALT  16  /  AlkPhos  80  10-02    Lactate: Lactate, Blood: 2.7 mmol/L (10-02 @ 22:45)   10-02 @ 19:28  4.30                    IMAGING:                                                                               ACS Surgery H&P          HPI: Ms. Mcallister is a 86 yo F with a hx of HTN, HLD, hypothyroidism , afib (on Eliquis) multiple abdominal surgeries (bowel resection with ileostomy and reversal ~10 yrs ago at [facility], hysterectomy 2/2 to large uterine fibroids, cholecystectomy) who presents to ED with a one day hx of abdominal discomfort, distension, N&V Her last BM/flatus was on yesterday. Endorses a hx of constipation. Denies any brbpr, melena, fever/chills, CP, and/or SOB. . Patient states that the he had similar symptoms ~ 1 year ago, where she was treated conservatively for a SBO that resolved. Recent CT demonstrates a SBO with a transition point at the ileocolic anastomosis. Patient lactate elevated to 4.3, improved to 2.7 with IVF. Hemoconcentrated (HgB 16). She is noted to be tachycardic, asymptomatic. Otherwise,  HDS.        PAST MEDICAL HISTORY:  CAD (coronary artery disease)    HLD (hyperlipidemia)    Afib    Hypothyroidism    Uterine fibroids       PAST SURGICAL HISTORY:  SBR with ileostomy, reversal    Cholecystectomy     Hysterectomy     MEDICATIONS:  heparin   Injectable 5000 Unit(s) SubCutaneous every 8 hours  lactated ringers Bolus 1000 milliLiter(s) IV Bolus once  lactated ringers. 1000 milliLiter(s) IV Continuous <Continuous>  levothyroxine 50 MICROGram(s) Oral daily  pantoprazole  Injectable 40 milliGRAM(s) IV Push daily        ALLERGIES:  No Known Allergies

## 2022-10-03 NOTE — H&P ADULT - ASSESSMENT
A: Ms. Mcallister is a 86 yo F with a hx of HTN, HLD, hypothyroidism , afib (on Eliquis) multiple abdominal surgeries (bowel resection with ileostomy and reversal ~10 yrs ago at [facility], hysterectomy 2/2 to large uterine fibroids, cholecystectomy) who presents to ED with recurrent SBO. Most recent episode was one year ago, resolution with conservative management. Patient is dehydrated (elevated lacate, hemoconcentration), that improved with IVF. She has no evidence of peritonitis on exam. NGT place with 500 cc of bilious output. Patient comfortable at this time.     Plan:   - Admit to Surgery   - NGT decompression, LCWS   - Monitor NGT o/p   - NPO w/IVF   - Serial abdominal exams   - Trend lactate, improving  - AM labs, optimize lytes and replete PRN   - Monitor for return of bowel function   - Encourage ambulation  - CARDS consultation , afib , ? PVC , asymptomatic   - Monitor on telemetry  - DVT ppx, hold AC    Plan discussed with on call ACS Surgery Attending.

## 2022-10-03 NOTE — CHART NOTE - NSCHARTNOTEFT_GEN_A_CORE
Called by RN to evaluate patient for abdominal pain. Upon seeing patient she stated she is having crampy abdominal pain. VS reviewed. Exam with distended abdomen. Patency of NG Tube ensured and confirmed. IV Tylenol x 1 and IV Dilaudid 1mg x 1 IVP provided for pain relief. Patient now with bed on floor. Will continue to evaluate via abdominal exams and pain relief.

## 2022-10-04 LAB
ANION GAP SERPL CALC-SCNC: 14 MMOL/L — SIGNIFICANT CHANGE UP (ref 5–17)
APPEARANCE UR: CLEAR — SIGNIFICANT CHANGE UP
BASOPHILS # BLD AUTO: 0.05 K/UL — SIGNIFICANT CHANGE UP (ref 0–0.2)
BASOPHILS NFR BLD AUTO: 0.8 % — SIGNIFICANT CHANGE UP (ref 0–2)
BILIRUB UR-MCNC: NEGATIVE — SIGNIFICANT CHANGE UP
BUN SERPL-MCNC: 17.9 MG/DL — SIGNIFICANT CHANGE UP (ref 8–20)
CALCIUM SERPL-MCNC: 9.2 MG/DL — SIGNIFICANT CHANGE UP (ref 8.4–10.5)
CHLORIDE SERPL-SCNC: 103 MMOL/L — SIGNIFICANT CHANGE UP (ref 98–107)
CO2 SERPL-SCNC: 23 MMOL/L — SIGNIFICANT CHANGE UP (ref 22–29)
COLOR SPEC: YELLOW — SIGNIFICANT CHANGE UP
CREAT SERPL-MCNC: 0.51 MG/DL — SIGNIFICANT CHANGE UP (ref 0.5–1.3)
DIFF PNL FLD: NEGATIVE — SIGNIFICANT CHANGE UP
EGFR: 91 ML/MIN/1.73M2 — SIGNIFICANT CHANGE UP
EOSINOPHIL # BLD AUTO: 0.29 K/UL — SIGNIFICANT CHANGE UP (ref 0–0.5)
EOSINOPHIL NFR BLD AUTO: 4.6 % — SIGNIFICANT CHANGE UP (ref 0–6)
GLUCOSE SERPL-MCNC: 166 MG/DL — HIGH (ref 70–99)
GLUCOSE UR QL: NEGATIVE MG/DL — SIGNIFICANT CHANGE UP
HCT VFR BLD CALC: 34.4 % — LOW (ref 34.5–45)
HGB BLD-MCNC: 11 G/DL — LOW (ref 11.5–15.5)
IMM GRANULOCYTES NFR BLD AUTO: 0.6 % — SIGNIFICANT CHANGE UP (ref 0–0.9)
KETONES UR-MCNC: NEGATIVE — SIGNIFICANT CHANGE UP
LEUKOCYTE ESTERASE UR-ACNC: NEGATIVE — SIGNIFICANT CHANGE UP
LYMPHOCYTES # BLD AUTO: 2.56 K/UL — SIGNIFICANT CHANGE UP (ref 1–3.3)
LYMPHOCYTES # BLD AUTO: 40.6 % — SIGNIFICANT CHANGE UP (ref 13–44)
MAGNESIUM SERPL-MCNC: 1.5 MG/DL — LOW (ref 1.6–2.6)
MCHC RBC-ENTMCNC: 30.9 PG — SIGNIFICANT CHANGE UP (ref 27–34)
MCHC RBC-ENTMCNC: 32 GM/DL — SIGNIFICANT CHANGE UP (ref 32–36)
MCV RBC AUTO: 96.6 FL — SIGNIFICANT CHANGE UP (ref 80–100)
MONOCYTES # BLD AUTO: 0.99 K/UL — HIGH (ref 0–0.9)
MONOCYTES NFR BLD AUTO: 15.7 % — HIGH (ref 2–14)
NEUTROPHILS # BLD AUTO: 2.37 K/UL — SIGNIFICANT CHANGE UP (ref 1.8–7.4)
NEUTROPHILS NFR BLD AUTO: 37.7 % — LOW (ref 43–77)
NITRITE UR-MCNC: NEGATIVE — SIGNIFICANT CHANGE UP
PH UR: 5 — SIGNIFICANT CHANGE UP (ref 5–8)
PHOSPHATE SERPL-MCNC: 4.7 MG/DL — SIGNIFICANT CHANGE UP (ref 2.4–4.7)
PLATELET # BLD AUTO: 280 K/UL — SIGNIFICANT CHANGE UP (ref 150–400)
POTASSIUM SERPL-MCNC: 4.3 MMOL/L — SIGNIFICANT CHANGE UP (ref 3.5–5.3)
POTASSIUM SERPL-SCNC: 4.3 MMOL/L — SIGNIFICANT CHANGE UP (ref 3.5–5.3)
PROT UR-MCNC: NEGATIVE — SIGNIFICANT CHANGE UP
RBC # BLD: 3.56 M/UL — LOW (ref 3.8–5.2)
RBC # FLD: 14.6 % — HIGH (ref 10.3–14.5)
SODIUM SERPL-SCNC: 140 MMOL/L — SIGNIFICANT CHANGE UP (ref 135–145)
SP GR SPEC: 1.02 — SIGNIFICANT CHANGE UP (ref 1.01–1.02)
UROBILINOGEN FLD QL: NEGATIVE MG/DL — SIGNIFICANT CHANGE UP
WBC # BLD: 6.3 K/UL — SIGNIFICANT CHANGE UP (ref 3.8–10.5)
WBC # FLD AUTO: 6.3 K/UL — SIGNIFICANT CHANGE UP (ref 3.8–10.5)

## 2022-10-04 PROCEDURE — 74018 RADEX ABDOMEN 1 VIEW: CPT | Mod: 26

## 2022-10-04 RX ORDER — DIATRIZOATE MEGLUMINE 180 MG/ML
90 INJECTION, SOLUTION INTRAVESICAL ONCE
Refills: 0 | Status: DISCONTINUED | OUTPATIENT
Start: 2022-10-04 | End: 2022-10-05

## 2022-10-04 RX ORDER — SODIUM CHLORIDE 9 MG/ML
500 INJECTION, SOLUTION INTRAVENOUS ONCE
Refills: 0 | Status: COMPLETED | OUTPATIENT
Start: 2022-10-04 | End: 2022-10-04

## 2022-10-04 RX ORDER — ACETAMINOPHEN 500 MG
975 TABLET ORAL EVERY 6 HOURS
Refills: 0 | Status: DISCONTINUED | OUTPATIENT
Start: 2022-10-04 | End: 2022-10-05

## 2022-10-04 RX ORDER — ACETAMINOPHEN 500 MG
1000 TABLET ORAL ONCE
Refills: 0 | Status: COMPLETED | OUTPATIENT
Start: 2022-10-04 | End: 2022-10-04

## 2022-10-04 RX ORDER — MAGNESIUM SULFATE 500 MG/ML
2 VIAL (ML) INJECTION ONCE
Refills: 0 | Status: COMPLETED | OUTPATIENT
Start: 2022-10-04 | End: 2022-10-04

## 2022-10-04 RX ADMIN — SODIUM CHLORIDE 150 MILLILITER(S): 9 INJECTION, SOLUTION INTRAVENOUS at 01:08

## 2022-10-04 RX ADMIN — Medication 25 MICROGRAM(S): at 23:01

## 2022-10-04 RX ADMIN — PANTOPRAZOLE SODIUM 40 MILLIGRAM(S): 20 TABLET, DELAYED RELEASE ORAL at 11:11

## 2022-10-04 RX ADMIN — Medication 25 GRAM(S): at 15:39

## 2022-10-04 RX ADMIN — Medication 1000 MILLIGRAM(S): at 02:10

## 2022-10-04 RX ADMIN — Medication 400 MILLIGRAM(S): at 01:40

## 2022-10-04 RX ADMIN — HEPARIN SODIUM 5000 UNIT(S): 5000 INJECTION INTRAVENOUS; SUBCUTANEOUS at 17:51

## 2022-10-04 RX ADMIN — HEPARIN SODIUM 5000 UNIT(S): 5000 INJECTION INTRAVENOUS; SUBCUTANEOUS at 01:09

## 2022-10-04 RX ADMIN — HEPARIN SODIUM 5000 UNIT(S): 5000 INJECTION INTRAVENOUS; SUBCUTANEOUS at 11:11

## 2022-10-04 RX ADMIN — SODIUM CHLORIDE 500 MILLILITER(S): 9 INJECTION, SOLUTION INTRAVENOUS at 08:34

## 2022-10-04 NOTE — PROGRESS NOTE ADULT - ATTENDING COMMENTS
Patient seen and examined   Awake alert oriented  Bilateral BS  Hemodynamic intact  Abdomen soft, non tender and mildly distended.   Patient passed gas and had 2 BMs  Gastrografin study today, pending results, may or may not start on diet  Neurologic intact Patient seen and examined   Awake alert oriented  Bilateral BS  Hemodynamic intact  Abdomen soft, non tender and mildly distended.   Patient passed gas and had 2 BMs  Gastrografin study today, pending results, may or may not start on diet  Every effort should be made to avoid surgery, for this patient is at high risk of surgical misadventures and complications, including fistulas, leaks and recurrent obstruction  Neurologic intact

## 2022-10-04 NOTE — CHART NOTE - NSCHARTNOTEFT_GEN_A_CORE
Patients gastrographin challenge showed contrast throughout colon and into rectum. Removed NG tube, patient tolerated well.

## 2022-10-05 ENCOUNTER — TRANSCRIPTION ENCOUNTER (OUTPATIENT)
Age: 87
End: 2022-10-05

## 2022-10-05 VITALS
HEART RATE: 102 BPM | SYSTOLIC BLOOD PRESSURE: 106 MMHG | OXYGEN SATURATION: 90 % | RESPIRATION RATE: 16 BRPM | TEMPERATURE: 100 F | DIASTOLIC BLOOD PRESSURE: 71 MMHG

## 2022-10-05 LAB
ACANTHOCYTES BLD QL SMEAR: SLIGHT — SIGNIFICANT CHANGE UP
ANION GAP SERPL CALC-SCNC: 10 MMOL/L — SIGNIFICANT CHANGE UP (ref 5–17)
ANISOCYTOSIS BLD QL: SLIGHT — SIGNIFICANT CHANGE UP
BASOPHILS # BLD AUTO: 0 K/UL — SIGNIFICANT CHANGE UP (ref 0–0.2)
BASOPHILS NFR BLD AUTO: 0 % — SIGNIFICANT CHANGE UP (ref 0–2)
BUN SERPL-MCNC: 16.1 MG/DL — SIGNIFICANT CHANGE UP (ref 8–20)
CALCIUM SERPL-MCNC: 8.1 MG/DL — LOW (ref 8.4–10.5)
CHLORIDE SERPL-SCNC: 100 MMOL/L — SIGNIFICANT CHANGE UP (ref 98–107)
CO2 SERPL-SCNC: 30 MMOL/L — HIGH (ref 22–29)
CREAT SERPL-MCNC: 0.6 MG/DL — SIGNIFICANT CHANGE UP (ref 0.5–1.3)
EGFR: 87 ML/MIN/1.73M2 — SIGNIFICANT CHANGE UP
ELLIPTOCYTES BLD QL SMEAR: SLIGHT — SIGNIFICANT CHANGE UP
EOSINOPHIL # BLD AUTO: 0.31 K/UL — SIGNIFICANT CHANGE UP (ref 0–0.5)
EOSINOPHIL NFR BLD AUTO: 5.3 % — SIGNIFICANT CHANGE UP (ref 0–6)
GIANT PLATELETS BLD QL SMEAR: PRESENT — SIGNIFICANT CHANGE UP
GLUCOSE SERPL-MCNC: 78 MG/DL — SIGNIFICANT CHANGE UP (ref 70–99)
HCT VFR BLD CALC: 39.9 % — SIGNIFICANT CHANGE UP (ref 34.5–45)
HGB BLD-MCNC: 12.6 G/DL — SIGNIFICANT CHANGE UP (ref 11.5–15.5)
LYMPHOCYTES # BLD AUTO: 0.74 K/UL — LOW (ref 1–3.3)
LYMPHOCYTES # BLD AUTO: 12.5 % — LOW (ref 13–44)
MAGNESIUM SERPL-MCNC: 2.2 MG/DL — SIGNIFICANT CHANGE UP (ref 1.6–2.6)
MANUAL SMEAR VERIFICATION: SIGNIFICANT CHANGE UP
MCHC RBC-ENTMCNC: 26.8 PG — LOW (ref 27–34)
MCHC RBC-ENTMCNC: 31.6 GM/DL — LOW (ref 32–36)
MCV RBC AUTO: 84.9 FL — SIGNIFICANT CHANGE UP (ref 80–100)
METAMYELOCYTES # FLD: 1.8 % — HIGH (ref 0–0)
MICROCYTES BLD QL: SLIGHT — SIGNIFICANT CHANGE UP
MONOCYTES # BLD AUTO: 0.27 K/UL — SIGNIFICANT CHANGE UP (ref 0–0.9)
MONOCYTES NFR BLD AUTO: 4.5 % — SIGNIFICANT CHANGE UP (ref 2–14)
MYELOCYTES NFR BLD: 0.9 % — HIGH (ref 0–0)
NEUTROPHILS # BLD AUTO: 4.45 K/UL — SIGNIFICANT CHANGE UP (ref 1.8–7.4)
NEUTROPHILS NFR BLD AUTO: 72.3 % — SIGNIFICANT CHANGE UP (ref 43–77)
NEUTS BAND # BLD: 2.7 % — SIGNIFICANT CHANGE UP (ref 0–8)
NRBC # BLD: 1 /100 — HIGH (ref 0–0)
OVALOCYTES BLD QL SMEAR: SLIGHT — SIGNIFICANT CHANGE UP
PHOSPHATE SERPL-MCNC: 2.2 MG/DL — LOW (ref 2.4–4.7)
PLAT MORPH BLD: ABNORMAL
PLATELET # BLD AUTO: 236 K/UL — SIGNIFICANT CHANGE UP (ref 150–400)
POIKILOCYTOSIS BLD QL AUTO: SLIGHT — SIGNIFICANT CHANGE UP
POLYCHROMASIA BLD QL SMEAR: SLIGHT — SIGNIFICANT CHANGE UP
POTASSIUM SERPL-MCNC: 3.2 MMOL/L — LOW (ref 3.5–5.3)
POTASSIUM SERPL-SCNC: 3.2 MMOL/L — LOW (ref 3.5–5.3)
RBC # BLD: 4.7 M/UL — SIGNIFICANT CHANGE UP (ref 3.8–5.2)
RBC # FLD: 17 % — HIGH (ref 10.3–14.5)
RBC BLD AUTO: ABNORMAL
SMUDGE CELLS # BLD: PRESENT — SIGNIFICANT CHANGE UP
SODIUM SERPL-SCNC: 140 MMOL/L — SIGNIFICANT CHANGE UP (ref 135–145)
WBC # BLD: 5.93 K/UL — SIGNIFICANT CHANGE UP (ref 3.8–10.5)
WBC # FLD AUTO: 5.93 K/UL — SIGNIFICANT CHANGE UP (ref 3.8–10.5)

## 2022-10-05 RX ORDER — POTASSIUM PHOSPHATE, MONOBASIC POTASSIUM PHOSPHATE, DIBASIC 236; 224 MG/ML; MG/ML
30 INJECTION, SOLUTION INTRAVENOUS ONCE
Refills: 0 | Status: COMPLETED | OUTPATIENT
Start: 2022-10-05 | End: 2022-10-05

## 2022-10-05 RX ORDER — LEVOTHYROXINE SODIUM 125 MCG
50 TABLET ORAL DAILY
Refills: 0 | Status: DISCONTINUED | OUTPATIENT
Start: 2022-10-05 | End: 2022-10-05

## 2022-10-05 RX ORDER — ACETAMINOPHEN 500 MG
3 TABLET ORAL
Qty: 0 | Refills: 0 | DISCHARGE
Start: 2022-10-05

## 2022-10-05 RX ORDER — POTASSIUM CHLORIDE 20 MEQ
40 PACKET (EA) ORAL ONCE
Refills: 0 | Status: COMPLETED | OUTPATIENT
Start: 2022-10-05 | End: 2022-10-05

## 2022-10-05 RX ADMIN — SODIUM CHLORIDE 100 MILLILITER(S): 9 INJECTION, SOLUTION INTRAVENOUS at 01:26

## 2022-10-05 RX ADMIN — HEPARIN SODIUM 5000 UNIT(S): 5000 INJECTION INTRAVENOUS; SUBCUTANEOUS at 01:34

## 2022-10-05 RX ADMIN — POTASSIUM PHOSPHATE, MONOBASIC POTASSIUM PHOSPHATE, DIBASIC 83.33 MILLIMOLE(S): 236; 224 INJECTION, SOLUTION INTRAVENOUS at 10:33

## 2022-10-05 RX ADMIN — Medication 40 MILLIEQUIVALENT(S): at 10:34

## 2022-10-05 RX ADMIN — Medication 100 MILLIGRAM(S): at 06:16

## 2022-10-05 RX ADMIN — HEPARIN SODIUM 5000 UNIT(S): 5000 INJECTION INTRAVENOUS; SUBCUTANEOUS at 10:35

## 2022-10-05 RX ADMIN — Medication 50 MICROGRAM(S): at 10:34

## 2022-10-05 NOTE — PROGRESS NOTE ADULT - ASSESSMENT
A: Ms. Mcallister is a 86 yo F with a hx of HTN, HLD, hypothyroidism , afib (on Eliquis) multiple abdominal surgeries (bowel resection with ileostomy and reversal ~10 yrs ago at [facility], hysterectomy 2/2 to large uterine fibroids, cholecystectomy) who presents to ED with recurrent SBO. Most recent episode was one year ago, resolution with conservative management. Patient is dehydrated (elevated lacate, hemoconcentration), that improved with IVF. She has no evidence of peritonitis on exam. NGT place with 500 cc of bilious output. Patient comfortable at this time.     Plan:   - NG tube removed  - restarted home meds    - advance diet today  - Serial abdominal exams   - AM labs, optimize lytes and replete PRN   - Monitor for return of bowel function   - Encourage ambulation  - CARDS consultation , afib , ? PVC , asymptomatic   - Monitor on telemetry  - antonino resolved  - dispo pending
A: Ms. Mcallister is a 86 yo F with a hx of HTN, HLD, hypothyroidism , afib (on Eliquis) multiple abdominal surgeries (bowel resection with ileostomy and reversal ~10 yrs ago at [facility], hysterectomy 2/2 to large uterine fibroids, cholecystectomy) who presents to ED with recurrent SBO. Most recent episode was one year ago, resolution with conservative management. Patient is dehydrated (elevated lacate, hemoconcentration), that improved with IVF. She has no evidence of peritonitis on exam. NGT place with 500 cc of bilious output. Patient comfortable at this time.     Plan:   - GG challenge this am?  - restarted home meds   - NGT decompression, LCWS   - Monitor NGT o/p   - NPO w/IVF except for meds  - Serial abdominal exams   - Trend lactate, improving  - AM labs, optimize lytes and replete PRN   - Monitor for return of bowel function   - Encourage ambulation  - CARDS consultation , afib , ? PVC , asymptomatic   - Monitor on telemetry  - DVT ppx, hold AC

## 2022-10-05 NOTE — DISCHARGE NOTE PROVIDER - NSDCCPCAREPLAN_GEN_ALL_CORE_FT
PRINCIPAL DISCHARGE DIAGNOSIS  Diagnosis: Small bowel obstruction  Assessment and Plan of Treatment: Follow up: Please call and make an appointment with the Acute Care Surgery Clinic 10-14 days after discharge. Also, please call and make an appointment with your primary care physician as per your usual schedule.   Activity: May return to normal activities as tolerated.  Diet: May continue regular diet.  Medications: Please resume all home medications as previously prescribed. Tylenol for pain relief, as needed.   Patient is advised to RETURN TO THE EMERGENCY DEPARTMENT for any of the following - worsening pain, fever/chills, nausea/vomiting, altered mental status, chest pain, shortness of breath, or any other new / worsening symptom.

## 2022-10-05 NOTE — DISCHARGE NOTE PROVIDER - NSFOLLOWUPCLINICS_GEN_ALL_ED_FT
[facility] Acute Care Surgery  Acute Care Surgery  [address] [place] [id number]  Phone: [phone]  Fax:

## 2022-10-05 NOTE — PROGRESS NOTE ADULT - ATTENDING COMMENTS
Awake alert  Hemodynamic intact  Bilateral BS   Abdomen soft, no RG  Neurologic intact  GG study passed, diet advanced and plan to DC patient today

## 2022-10-05 NOTE — DISCHARGE NOTE PROVIDER - NSDCMRMEDTOKEN_GEN_ALL_CORE_FT
acetaminophen 325 mg oral tablet: 3 tab(s) orally every 6 hours, As needed, Mild Pain (1 - 3)  Coenzyme Q10 10 mg oral capsule: 1 cap(s) orally once a day  Eliquis 5 mg oral tablet: 1 tab(s) orally 2 times a day  Metoprolol Succinate  mg oral tablet, extended release: 1 tab(s) orally once a day  Multiple Vitamins oral tablet: 1 tab(s) orally once a day  Prevagen 50 mcg (2000 intl units) oral capsule: 1 cap(s) orally once a day  Synthroid 50 mcg (0.05 mg) oral tablet: 1 tab(s) orally once a day  triamterene-hydrochlorothiazide 37.5 mg-25 mg oral tablet: 1 tab(s) orally once a day  Vascepa 1 g oral capsule: 2 cap(s) orally 2 times a day  Vitamin C: 1 tab(s) orally once a day  Vitamin D3: 1 tab(s) orally once a day

## 2022-10-05 NOTE — DISCHARGE NOTE PROVIDER - HOSPITAL COURSE
Admission HPI: Ms. Mcallister is a 88 yo F with a hx of HTN, HLD, hypothyroidism , afib (on Eliquis) multiple abdominal surgeries (bowel resection with ileostomy and reversal ~10 yrs ago at [facility], hysterectomy 2/2 to large uterine fibroids, cholecystectomy) who presents to ED with a one day hx of abdominal discomfort, distension, N&V Her last BM/flatus was on yesterday. Endorses a hx of constipation. Denies any brbpr, melena, fever/chills, CP, and/or SOB. . Patient states that the he had similar symptoms ~ 1 year ago, where she was treated conservatively for a SBO that resolved. Recent CT demonstrates a SBO with a transition point at the ileocolic anastomosis. Patient lactate elevated to 4.3, improved to 2.7 with IVF. Hemoconcentrated (HgB 16). She is noted to be tachycardic, asymptomatic. Otherwise,  HDS.      Hospital Course: Patient was admitted to the acute care surgery service. Made NPO w/ IVF & NGT was placed for decompression. Pt's abdominal distension and discomfort improved w/ NGT. She began to pass gas & NGT output decreased. She was given gastrografin via NGT on 10/4 and subsequent KUB showed that contrast was through colon. NGT was removed & pt advanced to clear liquid diet. Pt had continued bowel fxn, tolerated clears, and was adv to regular diet. She is currently tolerating diet, OOB ambulating, having consistent bowel fxn, voiding, and stable for discharge with outpatient follow-up.    Patient is advised to RETURN TO THE EMERGENCY DEPARTMENT for any of the following - worsening pain, fever/chills, nausea/vomiting, altered mental status, chest pain, shortness of breath, or any other new / worsening symptom.

## 2022-10-05 NOTE — DISCHARGE NOTE NURSING/CASE MANAGEMENT/SOCIAL WORK - NSDCPEELIQUISDIET_GEN_ALL_CORE
pt with left shoulder pain after fall.  xr, pain control, splint Eat healthy foods you enjoy. Apixaban/Eliquis DOES NOT have a special diet. Limit your alcohol intake.

## 2022-10-05 NOTE — DISCHARGE NOTE NURSING/CASE MANAGEMENT/SOCIAL WORK - PATIENT PORTAL LINK FT
You can access the FollowMyHealth Patient Portal offered by [facility] by registering at the following website: [email or web address]. By joining [employer]’s FollowMyHealth portal, you will also be able to view your health information using other applications (apps) compatible with our system.

## 2022-10-05 NOTE — PROGRESS NOTE ADULT - SUBJECTIVE AND OBJECTIVE BOX
SUBJECTIVE/24 hour events:  Patient is a 87yFemale presenting with SBO, being managed conservatively. Patient with NG tube in place, no acute events overnight, mild pain being able to be managed with tylenol. Patient has a history of afib on lopressor for rate control, has been been mildly tachycardiac since admission, and did have a few episodes of rapid afib, restarted home lopressor 100mg xl and has been in the between 100-120, asymptomatic. Patient requested melatonin for sleep.      Vital Signs Last 24 Hrs  T(C): 36.8 (03 Oct 2022 15:40), Max: 36.8 (03 Oct 2022 11:35)  T(F): 98.2 (03 Oct 2022 15:40), Max: 98.2 (03 Oct 2022 11:35)  HR: 114 (03 Oct 2022 15:40) (114 - 133)  BP: 107/63 (03 Oct 2022 15:40) (107/63 - 119/70)  BP(mean): --  RR: 19 (03 Oct 2022 15:40) (18 - 19)  SpO2: 92% (03 Oct 2022 15:40) (92% - 96%)    Parameters below as of 03 Oct 2022 11:35  Patient On (Oxygen Delivery Method): room air      Drug Dosing Weight  Height (cm): 180.3 (02 Oct 2022 18:10)  Weight (kg): 80.3 (02 Oct 2022 18:10)  BMI (kg/m2): 24.7 (02 Oct 2022 18:10)  BSA (m2): 2 (02 Oct 2022 18:10)  I&O's Detail    03 Oct 2022 07:01  -  04 Oct 2022 02:02  --------------------------------------------------------  IN:    IV PiggyBack: 100 mL    multiple electrolytes Injection Type 1.: 600 mL  Total IN: 700 mL    OUT:    Nasogastric/Oral tube (mL): 800 mL    Voided (mL): 450 mL  Total OUT: 1250 mL    Total NET: -550 mL        Allergies    No Known Allergies    Intolerances                              15.2   7.73  )-----------( 246      ( 03 Oct 2022 04:19 )             47.3   10-03    139  |  97<L>  |  42.6<H>  ----------------------------<  140<H>  3.6   |  27.0  |  1.22    Ca    9.1      03 Oct 2022 04:19  Phos  3.5     10-03  Mg     2.2     10-03    TPro  7.9  /  Alb  4.7  /  TBili  1.1  /  DBili  x   /  AST  23  /  ALT  16  /  AlkPhos  80  10-02      ROS:    PHYSICAL EXAM:  Constitutional: in good spirits, appears younger than stated age  Respiratory: no respiratory distress, no dyspnea, no supplemental o2 needed  Gastrointestinal: abdomen softly distended, mild ttp to umbilical area, no guarding, no peritonitis, NG tube in place  Genitourinary: voiding spontaneously   Neurological: A&OX3  Skin: warm, dry and no rashes           MEDICATIONS  (STANDING):  heparin   Injectable 5000 Unit(s) SubCutaneous every 8 hours  levothyroxine Injectable 25 MICROGram(s) IV Push at bedtime  metoprolol succinate  milliGRAM(s) Oral daily  metoprolol tartrate Injectable 5 milliGRAM(s) IV Push once  multiple electrolytes Injection Type 1 1000 milliLiter(s) (100 mL/Hr) IV Continuous <Continuous>  pantoprazole  Injectable 40 milliGRAM(s) IV Push daily    MEDICATIONS  (PRN):  acetaminophen     Tablet .. 975 milliGRAM(s) Oral every 6 hours PRN Mild Pain (1 - 3)  melatonin 5 milliGRAM(s) Oral at bedtime PRN Sleep      RADIOLOGY STUDIES:    CULTURES:        
SUBJECTIVE/24 hour events:  Patient is a 87yFemale presenting with SBO being treated conservatively. Patient passed gastro-graphin challenge and ng tube removed. No acute events overnight, no pain issues, denies n/v, antonino resolved. Likely to advance diet today    Vital Signs Last 24 Hrs  T(C): 36.8 (04 Oct 2022 21:42), Max: 37 (04 Oct 2022 10:01)  T(F): 98.2 (04 Oct 2022 21:42), Max: 98.6 (04 Oct 2022 10:01)  HR: 114 (04 Oct 2022 21:42) (107 - 119)  BP: 110/71 (04 Oct 2022 21:42) (91/61 - 110/71)  BP(mean): --  RR: 18 (04 Oct 2022 21:42) (17 - 18)  SpO2: 92% (04 Oct 2022 21:42) (90% - 92%)    Parameters below as of 04 Oct 2022 10:01  Patient On (Oxygen Delivery Method): room air      Drug Dosing Weight  Height (cm): 180.3 (02 Oct 2022 18:10)  Weight (kg): 80.3 (02 Oct 2022 18:10)  BMI (kg/m2): 24.7 (02 Oct 2022 18:10)  BSA (m2): 2 (02 Oct 2022 18:10)  I&O's Detail    03 Oct 2022 07:01  -  04 Oct 2022 07:00  --------------------------------------------------------  IN:    IV PiggyBack: 100 mL    multiple electrolytes Injection Type 1.: 600 mL  Total IN: 700 mL    OUT:    Nasogastric/Oral tube (mL): 950 mL    Voided (mL): 450 mL  Total OUT: 1400 mL    Total NET: -700 mL      04 Oct 2022 07:01  -  05 Oct 2022 01:32  --------------------------------------------------------  IN:    multiple electrolytes Injection Type 1 Bolus: 500 mL    multiple electrolytes Injection Type 1.: 1100 mL  Total IN: 1600 mL    OUT:    Nasogastric/Oral tube (mL): 100 mL    Oral Fluid: 0 mL    Voided (mL): 600 mL  Total OUT: 700 mL    Total NET: 900 mL        Allergies    No Known Allergies    Intolerances                              11.0   6.30  )-----------( 280      ( 04 Oct 2022 07:02 )             34.4   10-04    140  |  103  |  17.9  ----------------------------<  166<H>  4.3   |  23.0  |  0.51    Ca    9.2      04 Oct 2022 07:02  Phos  4.7     10-04  Mg     1.5     10-04        ROS:      PHYSICAL EXAM:  Constitutional: in good spirits, appears younger than stated age, states " i am so happy the tube is coming out"  Respiratory: no respiratory distress, no dyspnea, no supplemental o2 needed  Gastrointestinal: abdomen softly distended, mild ttp to umbilical area, no guarding, no peritonitis, NG tube in place  Genitourinary: voiding spontaneously   Neurological: A&OX3  Skin: warm, dry and no rashes         MEDICATIONS  (STANDING):  diatrizoate meglumine/diatrizoate sodium. 90 milliLiter(s) Oral once  heparin   Injectable 5000 Unit(s) SubCutaneous every 8 hours  levothyroxine Injectable 25 MICROGram(s) IV Push at bedtime  metoprolol succinate  milliGRAM(s) Oral daily  multiple electrolytes Injection Type 1 1000 milliLiter(s) (100 mL/Hr) IV Continuous <Continuous>  pantoprazole  Injectable 40 milliGRAM(s) IV Push daily    MEDICATIONS  (PRN):  acetaminophen     Tablet .. 975 milliGRAM(s) Oral every 6 hours PRN Mild Pain (1 - 3)  melatonin 5 milliGRAM(s) Oral at bedtime PRN Sleep      RADIOLOGY STUDIES:    CULTURES:

## 2022-10-05 NOTE — DISCHARGE NOTE NURSING/CASE MANAGEMENT/SOCIAL WORK - NSDCPEFALRISK_GEN_ALL_CORE
For information on Fall & Injury Prevention, visit: [email or web address] OR  [email or web address] OR  https://www.cdc.gov/steadi/patient.html

## 2022-10-06 NOTE — CDI QUERY NOTE - NSCDIOTHERTXTBX2_GEN_ALL_CORE_FT
Blood Gas Venous- Lactate:  Blood Gas Venous - Lactate: 4.30:  (10.02.22 @ 19:28)       Lactate, Blood:  Lactate, Blood: 2.7 mmol/L (10.02.22 @ 22:45)       H&P Adult [Charted Location: [facility] 1606 [facility]] [Authored: 03-Oct-2022 00:11]  …  Patient lactate elevated to 4.3, improved to 2.7 with IVF. Hemoconcentrated (HgB 16). She is noted to be tachycardic, asymptomatic. Otherwise,  HDS.          Sodium Chloride 0.9% 1000ml IV Bolus x 2 given Oct 2, 2022  Lactated Ringers Bolus 500 ml x2 given Oct 2, 2022      Please clarify if elevated lactate supports a clinically significant diagnosis.  A.	Lactic acidosis  B.	Acidosis  C.	Other, please specify  D.	Not clinically significant

## 2022-10-06 NOTE — CDI QUERY NOTE - NSCDIOTHERTXTBX_GEN_ALL_CORE_HH
10/3/22 12 LEAD ECD  Diagnosis Line *** Poor data quality, interpretation may be adversely affected  Atrial fibrillation with rapid ventricular response  Possible Anterior infarct , age undetermined  Abnormal ECG      H&P Adult [Charted Location: [facility] 65] [Authored: 03-Oct-2022 00:11]  History of Present Illness:                                                                               ACS Surgery H&P          HPI: Ms. Mcallister is a 88 yo F with a hx of HTN, HLD, hypothyroidism , afib (on Eliquis) multiple abdominal surgeries (bowel resection with ileostomy and reversal ~10 yrs ago at [facility], hysterectomy 2/2 to large uterine fibroids, cholecystectomy) who presents to ED with a one day hx of abdominal discomfort, distension, N&V Her last BM/flatus was on yesterday.    Home Medications:   Metoprolol Succinate  mg oral tablet, extended release: 1 tab(s) orally once a day      Please specify the type of Afib if known  A.	Chronic Afib  B.	Persistent afib  C.	Paroxysmal Afib  D.	Other, please specify  E.	Not clinically significant

## 2022-10-06 NOTE — CHART NOTE - NSCHARTNOTEFT_GEN_A_CORE
Please note upon review of chart the following diagnoses exists for this patient:    1.	Chronic Afib  2.	Lactic acidosis        Blood Gas Venous- Lactate:  Blood Gas Venous - Lactate: 4.30:  (10.02.22 @ 19:28)       Lactate, Blood:  Lactate, Blood: 2.7 mmol/L (10.02.22 @ 22:45)       H&P Adult [Charted Location: [place] [address] 65] [Authored: 03-Oct-2022 00:11]  …  Patient lactate elevated to 4.3, improved to 2.7 with IVF. Hemoconcentrated (HgB 16). She is noted to be tachycardic, asymptomatic. Otherwise,  HDS.          Sodium Chloride 0.9% 1000ml IV Bolus x 2 given Oct 2, 2022  Lactated Ringers Bolus 500 ml x2 given Oct 2, 2022

## 2022-10-08 LAB
CULTURE RESULTS: SIGNIFICANT CHANGE UP
CULTURE RESULTS: SIGNIFICANT CHANGE UP
SPECIMEN SOURCE: SIGNIFICANT CHANGE UP
SPECIMEN SOURCE: SIGNIFICANT CHANGE UP

## 2022-10-20 PROCEDURE — 99285 EMERGENCY DEPT VISIT HI MDM: CPT

## 2022-10-20 PROCEDURE — 87040 BLOOD CULTURE FOR BACTERIA: CPT

## 2022-10-20 PROCEDURE — 74176 CT ABD & PELVIS W/O CONTRAST: CPT | Mod: MA

## 2022-10-20 PROCEDURE — 96375 TX/PRO/DX INJ NEW DRUG ADDON: CPT

## 2022-10-20 PROCEDURE — 83735 ASSAY OF MAGNESIUM: CPT

## 2022-10-20 PROCEDURE — 80048 BASIC METABOLIC PNL TOTAL CA: CPT

## 2022-10-20 PROCEDURE — 93005 ELECTROCARDIOGRAM TRACING: CPT

## 2022-10-20 PROCEDURE — 86901 BLOOD TYPING SEROLOGIC RH(D): CPT

## 2022-10-20 PROCEDURE — 84100 ASSAY OF PHOSPHORUS: CPT

## 2022-10-20 PROCEDURE — U0003: CPT

## 2022-10-20 PROCEDURE — 96374 THER/PROPH/DIAG INJ IV PUSH: CPT

## 2022-10-20 PROCEDURE — 86900 BLOOD TYPING SEROLOGIC ABO: CPT

## 2022-10-20 PROCEDURE — 83690 ASSAY OF LIPASE: CPT

## 2022-10-20 PROCEDURE — 81003 URINALYSIS AUTO W/O SCOPE: CPT

## 2022-10-20 PROCEDURE — 86850 RBC ANTIBODY SCREEN: CPT

## 2022-10-20 PROCEDURE — 36415 COLL VENOUS BLD VENIPUNCTURE: CPT

## 2022-10-20 PROCEDURE — 85025 COMPLETE CBC W/AUTO DIFF WBC: CPT

## 2022-10-20 PROCEDURE — U0005: CPT

## 2022-10-20 PROCEDURE — 74018 RADEX ABDOMEN 1 VIEW: CPT

## 2022-10-20 PROCEDURE — 71045 X-RAY EXAM CHEST 1 VIEW: CPT

## 2022-10-20 PROCEDURE — 80053 COMPREHEN METABOLIC PANEL: CPT

## 2022-10-20 PROCEDURE — 83605 ASSAY OF LACTIC ACID: CPT

## 2023-03-28 NOTE — DISCHARGE NOTE PROVIDER - NSDCACTIVITY_GEN_ALL_CORE
Mr. Rosa Rivero presents for anticoagulation management of PE and DVT. Pt previously on warfarin (stopped 11/2014). He presents today w/out complaint. He golf's on MWF every week. Pt verifies dosing regimen. Pt denies s/s bleeding/bruising/swelling/SOB. No missed doses reported. No changes in Rx/OTC/Herbal medications. No diet changes--he does not typically eat greens. EtOH: Rarely,  Tobacco: Never    No changes     INR 2.5 is within acceptable therapeutic range of 2-3. Recommend to continue dose of 3.75mg on Tue and 2.5 mg all other days. Patient has Warfarin 2.5 mg tablets. Will continue to monitor and check INR in 5 weeks. Dosing reminder card given with phone number, appointment date and time.    Return to clinic: 5/5 @ 815 am   Referring MD: Dr. Sourav Martinez, PharmD 3/28/2023 8:07 AM
No restrictions

## 2023-06-13 PROBLEM — E03.9 HYPOTHYROIDISM, UNSPECIFIED: Chronic | Status: ACTIVE | Noted: 2022-10-02

## 2023-06-15 ENCOUNTER — FORM ENCOUNTER (OUTPATIENT)
Age: 88
End: 2023-06-15

## 2023-06-16 ENCOUNTER — APPOINTMENT (OUTPATIENT)
Dept: ORTHOPEDIC SURGERY | Facility: CLINIC | Age: 88
End: 2023-06-16
Payer: MEDICARE

## 2023-06-16 ENCOUNTER — APPOINTMENT (OUTPATIENT)
Dept: MRI IMAGING | Facility: CLINIC | Age: 88
End: 2023-06-16
Payer: MEDICARE

## 2023-06-16 DIAGNOSIS — Z86.79 PERSONAL HISTORY OF OTHER DISEASES OF THE CIRCULATORY SYSTEM: ICD-10-CM

## 2023-06-16 PROCEDURE — 72070 X-RAY EXAM THORAC SPINE 2VWS: CPT

## 2023-06-16 PROCEDURE — 99203 OFFICE O/P NEW LOW 30 MIN: CPT

## 2023-06-16 PROCEDURE — 72146 MRI CHEST SPINE W/O DYE: CPT | Mod: MH

## 2023-06-16 NOTE — PHYSICAL EXAM
[Disc space narrowing] : Disc space narrowing [Bilateral] : rib bilaterally [Facet arthropathy] : Facet arthropathy [] : no ecchymosis [FreeTextEntry8] : para scap mild tenderness.  [TWNoteComboBox7] : forward flexion 60 degrees [de-identified] : extension 10 degrees [de-identified] : left lateral bending 20 degrees [de-identified] : right lateral bending 20 degrees

## 2023-06-16 NOTE — HISTORY OF PRESENT ILLNESS
[de-identified] : 88 yo female RHD right medial border scapula pain greater than 2 months that can flare up severely. Hx of prior issues that resolved on its own. PCP prescribed tramadol, unable to take NSAIDS secondary to blood thinner,  but she would like to avoid further use. Denies n/t. There are no night symptoms. She has tried PT and acupuncture in the past without much help. She has trouble sleeping and findings a comfortable position when symptoms are present. nothing necessarily aggravates it. Non radiating, no constitutional\par \par  [FreeTextEntry1] : r

## 2023-06-16 NOTE — DISCUSSION/SUMMARY
[de-identified] : try lido patch\par try voltaren gel\par \par RE:  GORGE VALCIC \par \par Acct #- [id number] \par \par Attention:  Nurse Reviewer /Medical Director\par \par  \par Based on my patient's condition, I strongly believe that the MRI T spine is medically.necessary.  \par The patient has failed oral meds, injections and PT and conservative treatment in combination or by themselves and therefore needs the MRI.  \par The MRI will dictate further treatment t recommendations.\par heat\par see spine after mri\par \par

## 2023-06-22 ENCOUNTER — APPOINTMENT (OUTPATIENT)
Dept: ORTHOPEDIC SURGERY | Facility: CLINIC | Age: 88
End: 2023-06-22
Payer: MEDICARE

## 2023-06-22 VITALS — HEIGHT: 70 IN | BODY MASS INDEX: 24.34 KG/M2 | WEIGHT: 170 LBS

## 2023-06-22 PROCEDURE — 99214 OFFICE O/P EST MOD 30 MIN: CPT

## 2023-06-22 NOTE — ASSESSMENT
[FreeTextEntry1] : 87 F with with thoracic back pain and muscular spasm\par Kyphosis\par PT\par  We will also prescribe Muscle Relaxants as needed.  Discussed side effects including but not limited to drowsiness and dizziness.  Discussed patient should not drive after taking the medicine.\par FU 6 weeks\par Medical massage\par acupuncture

## 2023-06-22 NOTE — IMAGING
[de-identified] : Thoracic Spine:\par \par Inspection/Palpation\par No tenderness to palpation throughout Cervical/thoracic/lumbar spine. TTP over left paraspinal/trap \par No bony step offs, No lesions.\par \par Gait:\par Non-antalgic, able to perform bilateral toe and heel rise. \par Able to perform tandem gait. \par \par Range of Motion: \par Flexion to chin to chest, extension to 70 degrees, rotation 45 degrees bilaterally, Lateral flexion to 20 degrees bilaterally\par \par Neurologic:\par Bilateral upper extremities 5/5 Deltoid/Biceps/Triceps/ Wrist Flexion/Wrist Extension/ / Intrinsics \par \par Sensation intact to light touch C5-T1\par \par Biceps/Triceps/Brachioradialis Reflex within normal limits\par \par Negative Card's, No inverted brachioradials reflex

## 2023-06-22 NOTE — HISTORY OF PRESENT ILLNESS
[Upper back] : upper back [Mid-back] : mid-back [Gradual] : gradual [Sudden] : sudden [0] : 0 [de-identified] : 86 yo female RHD right medial border scapula pain greater than 2 months that can flare up severely. Hx of prior issues that resolved on its own. PCP prescribed tramadol, unable to take NSAIDS secondary to blood thinner for A-fib. Denies n/t. There are no night symptoms. Non radiating, no constitutional.\par \par  [] : no [de-identified] : MRI and XRay

## 2023-06-23 RX ORDER — BACLOFEN 10 MG/1
10 TABLET ORAL 3 TIMES DAILY
Qty: 30 | Refills: 0 | Status: ACTIVE | COMMUNITY
Start: 2023-06-23 | End: 1900-01-01

## 2023-09-07 ENCOUNTER — APPOINTMENT (OUTPATIENT)
Dept: ORTHOPEDIC SURGERY | Facility: CLINIC | Age: 88
End: 2023-09-07

## 2023-09-26 ENCOUNTER — NON-APPOINTMENT (OUTPATIENT)
Age: 88
End: 2023-09-26

## 2023-09-26 ENCOUNTER — RESULT REVIEW (OUTPATIENT)
Age: 88
End: 2023-09-26

## 2023-09-26 ENCOUNTER — APPOINTMENT (OUTPATIENT)
Dept: INTERNAL MEDICINE | Facility: CLINIC | Age: 88
End: 2023-09-26
Payer: MEDICARE

## 2023-09-26 VITALS
SYSTOLIC BLOOD PRESSURE: 115 MMHG | BODY MASS INDEX: 24.86 KG/M2 | WEIGHT: 164 LBS | HEIGHT: 68 IN | HEART RATE: 98 BPM | DIASTOLIC BLOOD PRESSURE: 65 MMHG | OXYGEN SATURATION: 94 %

## 2023-09-26 DIAGNOSIS — Z87.19 PERSONAL HISTORY OF OTHER DISEASES OF THE DIGESTIVE SYSTEM: ICD-10-CM

## 2023-09-26 DIAGNOSIS — Z78.9 OTHER SPECIFIED HEALTH STATUS: ICD-10-CM

## 2023-09-26 DIAGNOSIS — Z82.49 FAMILY HISTORY OF ISCHEMIC HEART DISEASE AND OTHER DISEASES OF THE CIRCULATORY SYSTEM: ICD-10-CM

## 2023-09-26 DIAGNOSIS — Z00.00 ENCOUNTER FOR GENERAL ADULT MEDICAL EXAMINATION W/OUT ABNORMAL FINDINGS: ICD-10-CM

## 2023-09-26 PROCEDURE — 36415 COLL VENOUS BLD VENIPUNCTURE: CPT

## 2023-09-26 PROCEDURE — 99204 OFFICE O/P NEW MOD 45 MIN: CPT | Mod: 25

## 2023-09-26 RX ORDER — APIXABAN 5 MG/1
TABLET, FILM COATED ORAL
Refills: 0 | Status: DISCONTINUED | COMMUNITY
End: 2023-09-26

## 2023-09-26 RX ORDER — METOPROLOL SUCCINATE 100 MG/1
100 TABLET, EXTENDED RELEASE ORAL
Refills: 0 | Status: ACTIVE | COMMUNITY
Start: 2023-09-26

## 2023-09-26 RX ORDER — ICOSAPENT ETHYL 1000 MG/1
1 CAPSULE ORAL
Refills: 0 | Status: ACTIVE | COMMUNITY
Start: 2023-09-26

## 2023-09-26 RX ORDER — TRIAMTERENE AND HYDROCHLOROTHIAZIDE 25; 37.5 MG/1; MG/1
37.5-25 TABLET ORAL
Refills: 0 | Status: ACTIVE | COMMUNITY
Start: 2023-09-26

## 2023-10-03 ENCOUNTER — NON-APPOINTMENT (OUTPATIENT)
Age: 88
End: 2023-10-03

## 2023-10-13 ENCOUNTER — APPOINTMENT (OUTPATIENT)
Dept: MRI IMAGING | Facility: CLINIC | Age: 88
End: 2023-10-13
Payer: MEDICARE

## 2023-10-13 PROCEDURE — 70551 MRI BRAIN STEM W/O DYE: CPT

## 2023-10-13 PROCEDURE — 76377 3D RENDER W/INTRP POSTPROCES: CPT

## 2023-10-17 LAB
APPEARANCE: CLEAR
BACTERIA UR CULT: NORMAL
BACTERIA: NEGATIVE /HPF
BASOPHILS # BLD AUTO: 0.05 K/UL
BASOPHILS NFR BLD AUTO: 0.6 %
BILIRUBIN URINE: NEGATIVE
BLOOD URINE: NEGATIVE
CAST: 0 /LPF
COLOR: YELLOW
EOSINOPHIL # BLD AUTO: 0.3 K/UL
EOSINOPHIL NFR BLD AUTO: 3.6 %
EPITHELIAL CELLS: 1 /HPF
FOLATE SERPL-MCNC: 16.3 NG/ML
GLUCOSE QUALITATIVE U: NEGATIVE MG/DL
HCT VFR BLD CALC: 39.6 %
HGB BLD-MCNC: 12.1 G/DL
IMM GRANULOCYTES NFR BLD AUTO: 1.7 %
KETONES URINE: NEGATIVE MG/DL
LEUKOCYTE ESTERASE URINE: ABNORMAL
LYMPHOCYTES # BLD AUTO: 0.99 K/UL
LYMPHOCYTES NFR BLD AUTO: 11.8 %
MAN DIFF?: NORMAL
MCHC RBC-ENTMCNC: 25.9 PG
MCHC RBC-ENTMCNC: 30.6 GM/DL
MCV RBC AUTO: 84.6 FL
MICROSCOPIC-UA: NORMAL
MONOCYTES # BLD AUTO: 0.43 K/UL
MONOCYTES NFR BLD AUTO: 5.1 %
NEUTROPHILS # BLD AUTO: 6.48 K/UL
NEUTROPHILS NFR BLD AUTO: 77.2 %
NITRITE URINE: NEGATIVE
PH URINE: 6.5
PLATELET # BLD AUTO: 298 K/UL
PROTEIN URINE: NEGATIVE MG/DL
RBC # BLD: 4.68 M/UL
RBC # FLD: 18 %
RED BLOOD CELLS URINE: 1 /HPF
SPECIFIC GRAVITY URINE: 1.02
T PALLIDUM AB SER QL IA: NEGATIVE
T4 FREE SERPL-MCNC: 1.9 NG/DL
TSH SERPL-ACNC: 0.42 UIU/ML
UROBILINOGEN URINE: 0.2 MG/DL
VIT B12 SERPL-MCNC: 728 PG/ML
WBC # FLD AUTO: 8.39 K/UL
WHITE BLOOD CELLS URINE: 0 /HPF

## 2023-10-24 ENCOUNTER — APPOINTMENT (OUTPATIENT)
Dept: INTERNAL MEDICINE | Facility: CLINIC | Age: 88
End: 2023-10-24
Payer: MEDICARE

## 2023-10-24 VITALS
OXYGEN SATURATION: 97 % | SYSTOLIC BLOOD PRESSURE: 117 MMHG | DIASTOLIC BLOOD PRESSURE: 77 MMHG | BODY MASS INDEX: 25.39 KG/M2 | WEIGHT: 167 LBS | HEART RATE: 92 BPM

## 2023-10-24 DIAGNOSIS — R41.3 OTHER AMNESIA: ICD-10-CM

## 2023-10-24 PROCEDURE — 99214 OFFICE O/P EST MOD 30 MIN: CPT | Mod: 25

## 2023-10-24 PROCEDURE — 36415 COLL VENOUS BLD VENIPUNCTURE: CPT

## 2023-10-24 RX ORDER — APIXABAN 5 MG/1
5 TABLET, FILM COATED ORAL
Qty: 180 | Refills: 1 | Status: ACTIVE | COMMUNITY
Start: 2023-09-26 | End: 1900-01-01

## 2023-10-26 LAB
APPEARANCE: ABNORMAL
BACTERIA UR CULT: NORMAL
BACTERIA: NEGATIVE /HPF
BILIRUBIN URINE: NEGATIVE
BLOOD URINE: NEGATIVE
CAST: 0 /LPF
COLOR: NORMAL
EPITHELIAL CELLS: 6 /HPF
GLUCOSE QUALITATIVE U: NEGATIVE MG/DL
KETONES URINE: NEGATIVE MG/DL
LEUKOCYTE ESTERASE URINE: ABNORMAL
MICROSCOPIC-UA: NORMAL
NITRITE URINE: NEGATIVE
PH URINE: 6.5
PROTEIN URINE: NEGATIVE MG/DL
RED BLOOD CELLS URINE: NORMAL /HPF
REVIEW: NORMAL
SPECIFIC GRAVITY URINE: 1.02
T4 FREE SERPL-MCNC: 1.4 NG/DL
TSH SERPL-ACNC: 0.95 UIU/ML
UROBILINOGEN URINE: 0.2 MG/DL
WHITE BLOOD CELLS URINE: 0 /HPF

## 2023-10-30 LAB — GI PCR PANEL: NOT DETECTED

## 2023-11-10 ENCOUNTER — NON-APPOINTMENT (OUTPATIENT)
Age: 88
End: 2023-11-10

## 2024-01-22 ENCOUNTER — APPOINTMENT (OUTPATIENT)
Dept: INTERNAL MEDICINE | Facility: CLINIC | Age: 89
End: 2024-01-22
Payer: MEDICARE

## 2024-01-22 VITALS
SYSTOLIC BLOOD PRESSURE: 144 MMHG | DIASTOLIC BLOOD PRESSURE: 83 MMHG | RESPIRATION RATE: 12 BRPM | HEIGHT: 68 IN | OXYGEN SATURATION: 98 % | WEIGHT: 173 LBS | BODY MASS INDEX: 26.22 KG/M2 | HEART RATE: 108 BPM

## 2024-01-22 VITALS — SYSTOLIC BLOOD PRESSURE: 132 MMHG | DIASTOLIC BLOOD PRESSURE: 82 MMHG

## 2024-01-22 DIAGNOSIS — R19.5 OTHER FECAL ABNORMALITIES: ICD-10-CM

## 2024-01-22 DIAGNOSIS — E78.1 PURE HYPERGLYCERIDEMIA: ICD-10-CM

## 2024-01-22 PROCEDURE — 99214 OFFICE O/P EST MOD 30 MIN: CPT | Mod: 25

## 2024-01-22 PROCEDURE — 36415 COLL VENOUS BLD VENIPUNCTURE: CPT

## 2024-01-22 RX ORDER — DONEPEZIL HYDROCHLORIDE 5 MG/1
5 TABLET, FILM COATED ORAL
Refills: 0 | Status: ACTIVE | COMMUNITY
Start: 2024-01-22

## 2024-01-22 NOTE — ASSESSMENT
[FreeTextEntry1] : 1.  Memory change/mood lability Recently started on Aricept 5 mg.  Mood is improved.  Will continue to monitor  2.  Hypothyroidism Repeat TFTs today.  3.  Hypertension, A-fib Blood pressure is well controlled Continue metoprolol, triamterene-HCTZ Continue Eliquis  4.  Resolution of diarrhea  Follow-up 3 to 4 months.  Sooner if needed.

## 2024-01-22 NOTE — PHYSICAL EXAM
[No Acute Distress] : no acute distress [Well Nourished] : well nourished [Well Developed] : well developed [Well-Appearing] : well-appearing [Normal Sclera/Conjunctiva] : normal sclera/conjunctiva [No Respiratory Distress] : no respiratory distress  [Thyroid Normal, No Nodules] : the thyroid was normal and there were no nodules present [No Accessory Muscle Use] : no accessory muscle use [Normal Rate] : normal rate  [Normal S1, S2] : normal S1 and S2 [No Edema] : there was no peripheral edema

## 2024-01-22 NOTE — REVIEW OF SYSTEMS
[Fever] : no fever [Chills] : no chills [Fatigue] : no fatigue [Night Sweats] : no night sweats [Vision Problems] : no vision problems [Palpitations] : no palpitations [Chest Pain] : no chest pain [Shortness Of Breath] : no shortness of breath [Abdominal Pain] : no abdominal pain [Headache] : no headache [Dizziness] : no dizziness

## 2024-01-22 NOTE — HISTORY OF PRESENT ILLNESS
[Family Member] : family member [FreeTextEntry1] : HTN, HLD, hypothyroidism  [de-identified] : Nadeen is here today for follow-up.  She is here with her daughter.  She has been feeling well overall.  Daughter notes that she has been doing much better as well.  Mood has been much improved.  Sleeping is also better.  Still some issues with short-term memory.  They did see neurology who started her on 5 mg of Aricept.  He has not yet started it.  Also saw her cardiologist.  No new chest pain, palpitations, shortness of breath, or dizziness/lightheadedness.

## 2024-02-04 LAB
ALBUMIN SERPL ELPH-MCNC: 4.6 G/DL
ALP BLD-CCNC: 59 U/L
ALT SERPL-CCNC: 18 U/L
ANION GAP SERPL CALC-SCNC: 13 MMOL/L
AST SERPL-CCNC: 24 U/L
BASOPHILS # BLD AUTO: 0.07 K/UL
BASOPHILS NFR BLD AUTO: 0.9 %
BILIRUB SERPL-MCNC: 0.7 MG/DL
BUN SERPL-MCNC: 24 MG/DL
CALCIUM SERPL-MCNC: 10.1 MG/DL
CHLORIDE SERPL-SCNC: 101 MMOL/L
CHOLEST SERPL-MCNC: 183 MG/DL
CO2 SERPL-SCNC: 28 MMOL/L
CREAT SERPL-MCNC: 0.98 MG/DL
EGFR: 56 ML/MIN/1.73M2
EOSINOPHIL # BLD AUTO: 0.44 K/UL
EOSINOPHIL NFR BLD AUTO: 5.7 %
ESTIMATED AVERAGE GLUCOSE: 111 MG/DL
GLUCOSE SERPL-MCNC: 85 MG/DL
HBA1C MFR BLD HPLC: 5.5 %
HCT VFR BLD CALC: 45.7 %
HDLC SERPL-MCNC: 54 MG/DL
HGB BLD-MCNC: 14.5 G/DL
IMM GRANULOCYTES NFR BLD AUTO: 0.4 %
LDLC SERPL CALC-MCNC: 94 MG/DL
LYMPHOCYTES # BLD AUTO: 1.27 K/UL
LYMPHOCYTES NFR BLD AUTO: 16.3 %
MAN DIFF?: NORMAL
MCHC RBC-ENTMCNC: 26.3 PG
MCHC RBC-ENTMCNC: 31.7 GM/DL
MCV RBC AUTO: 82.8 FL
MONOCYTES # BLD AUTO: 0.5 K/UL
MONOCYTES NFR BLD AUTO: 6.4 %
NEUTROPHILS # BLD AUTO: 5.46 K/UL
NEUTROPHILS NFR BLD AUTO: 70.3 %
NONHDLC SERPL-MCNC: 128 MG/DL
PLATELET # BLD AUTO: 283 K/UL
POTASSIUM SERPL-SCNC: 4.8 MMOL/L
PROT SERPL-MCNC: 6.8 G/DL
RBC # BLD: 5.52 M/UL
RBC # FLD: 18.9 %
SODIUM SERPL-SCNC: 142 MMOL/L
T4 FREE SERPL-MCNC: 1.9 NG/DL
TRIGL SERPL-MCNC: 204 MG/DL
TSH SERPL-ACNC: 0.38 UIU/ML
WBC # FLD AUTO: 7.77 K/UL

## 2024-02-04 RX ORDER — LEVOTHYROXINE SODIUM 50 UG/1
50 TABLET ORAL
Refills: 0 | Status: ACTIVE | COMMUNITY
Start: 2023-09-26

## 2024-04-29 ENCOUNTER — APPOINTMENT (OUTPATIENT)
Dept: INTERNAL MEDICINE | Facility: CLINIC | Age: 89
End: 2024-04-29
Payer: MEDICARE

## 2024-04-29 VITALS
SYSTOLIC BLOOD PRESSURE: 118 MMHG | BODY MASS INDEX: 27.83 KG/M2 | WEIGHT: 183 LBS | DIASTOLIC BLOOD PRESSURE: 77 MMHG | OXYGEN SATURATION: 97 % | HEART RATE: 99 BPM

## 2024-04-29 DIAGNOSIS — L98.9 DISORDER OF THE SKIN AND SUBCUTANEOUS TISSUE, UNSPECIFIED: ICD-10-CM

## 2024-04-29 DIAGNOSIS — E03.9 HYPOTHYROIDISM, UNSPECIFIED: ICD-10-CM

## 2024-04-29 DIAGNOSIS — I48.91 UNSPECIFIED ATRIAL FIBRILLATION: ICD-10-CM

## 2024-04-29 DIAGNOSIS — I10 ESSENTIAL (PRIMARY) HYPERTENSION: ICD-10-CM

## 2024-04-29 PROCEDURE — 99214 OFFICE O/P EST MOD 30 MIN: CPT

## 2024-04-29 PROCEDURE — G2211 COMPLEX E/M VISIT ADD ON: CPT

## 2024-04-29 RX ORDER — MUPIROCIN 20 MG/G
2 OINTMENT TOPICAL 3 TIMES DAILY
Qty: 1 | Refills: 1 | Status: ACTIVE | COMMUNITY
Start: 2024-04-29 | End: 1900-01-01

## 2024-04-29 NOTE — REVIEW OF SYSTEMS
[Fever] : no fever [Chills] : no chills [Fatigue] : no fatigue [Night Sweats] : no night sweats [Vision Problems] : no vision problems [Chest Pain] : no chest pain [Palpitations] : no palpitations [Shortness Of Breath] : no shortness of breath [Abdominal Pain] : no abdominal pain [Joint Pain] : joint pain [Headache] : no headache [Dizziness] : no dizziness

## 2024-04-29 NOTE — HISTORY OF PRESENT ILLNESS
[Family Member] : family member [FreeTextEntry1] : f/u thyroid function  [de-identified] : Nadeen is here today for follow-up.  She has been feeling well overall.  She has been taking levothyroxine 6 days/week.  Recent TFTs show normalization of values.  He also been having worsening neck/shoulder pain again.  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  However, she travel to [place] by [facility], and was unable to resume upon her return.  Having negative impact on her functioning and quality of life.  She also notes her rosacea has been worse.  There is a superficial scab lesion on the left cheek which has been there for over 1 month.

## 2024-04-29 NOTE — ASSESSMENT
[FreeTextEntry1] : 1.  Memory change/mood lability Aricpet 5mg   2.  Hypothyroidism Repeat TFTs at goal Fax to Endo Repotrt thyroid nodule followed by Endo  3.  Hypertension, A-fib Blood pressure is well controlled Continue metoprolol, triamterene-HCTZ Continue Eliquis    Follow-up 3 to 4 months for CPE

## 2024-04-29 NOTE — PHYSICAL EXAM
[No Acute Distress] : no acute distress [Well Nourished] : well nourished [Well Developed] : well developed [Well-Appearing] : well-appearing [Normal Sclera/Conjunctiva] : normal sclera/conjunctiva [Thyroid Normal, No Nodules] : the thyroid was normal and there were no nodules present [No Respiratory Distress] : no respiratory distress  [No Accessory Muscle Use] : no accessory muscle use [Normal Rate] : normal rate  [Normal S1, S2] : normal S1 and S2 [No Edema] : there was no peripheral edema

## 2024-05-06 ENCOUNTER — APPOINTMENT (OUTPATIENT)
Dept: ORTHOPEDIC SURGERY | Facility: CLINIC | Age: 89
End: 2024-05-06
Payer: MEDICARE

## 2024-05-06 VITALS — WEIGHT: 183 LBS | HEIGHT: 68 IN | BODY MASS INDEX: 27.74 KG/M2

## 2024-05-06 PROCEDURE — 99213 OFFICE O/P EST LOW 20 MIN: CPT

## 2024-05-06 PROCEDURE — 72050 X-RAY EXAM NECK SPINE 4/5VWS: CPT

## 2024-05-06 RX ORDER — METHYLPREDNISOLONE 4 MG/1
4 TABLET ORAL
Qty: 1 | Refills: 0 | Status: ACTIVE | COMMUNITY
Start: 2024-05-06 | End: 1900-01-01

## 2024-05-06 NOTE — HISTORY OF PRESENT ILLNESS
[Upper back] : upper back [Mid-back] : mid-back [Gradual] : gradual [Sudden] : sudden [0] : 0 [de-identified] : 05/06/2024: Patient presents today in follow up with right sided komal-scapular pain with intermittent flare ups. She currently reports having flare up of symptoms for the past 2 weeks. Has completed PT in past with significant improvement. Denies radicular pain, n/t. Denies acute trauma or injury.   06/22/2023: 88 yo female RHD right medial border scapula pain greater than 2 months that can flare up severely. Hx of prior issues that resolved on its own. PCP prescribed tramadol, unable to take NSAIDS secondary to blood thinner for A-fib. Denies n/t. There are no night symptoms. Non radiating, no constitutional.   [] : no [de-identified] : MRI and XRay

## 2024-05-06 NOTE — IMAGING
[de-identified] : Thoracic Spine:  Inspection/Palpation No tenderness to palpation throughout Cervical/thoracic/lumbar spine. TTP over left paraspinal/trap  No bony step offs, No lesions.  Gait: Non-antalgic, able to perform bilateral toe and heel rise.  Able to perform tandem gait.   Range of Motion:  Flexion to chin to chest, extension to 70 degrees, rotation 45 degrees bilaterally, Lateral flexion to 20 degrees bilaterally  Neurologic: Bilateral upper extremities 5/5 Deltoid/Biceps/Triceps/ Wrist Flexion/Wrist Extension/ / Intrinsics   Sensation intact to light touch C5-T1  Biceps/Triceps/Brachioradialis Reflex within normal limits  Negative Card's, No inverted brachioradials reflex  X-ray Ap/Lateral/Flexion/Extension of cervical spine were viewed and interpreted.  Normal alignment is maintained without any spondylolisthesis.  multilevel disc degeneration without fracture

## 2024-05-06 NOTE — ASSESSMENT
[FreeTextEntry1] : 87 F with with thoracic back pain and muscular spasm pericapsular pain.  Kyphosis PT MDP

## 2024-05-23 ENCOUNTER — APPOINTMENT (OUTPATIENT)
Dept: ORTHOPEDIC SURGERY | Facility: CLINIC | Age: 89
End: 2024-05-23
Payer: MEDICARE

## 2024-05-23 VITALS — HEIGHT: 68 IN | WEIGHT: 183 LBS | BODY MASS INDEX: 27.74 KG/M2

## 2024-05-23 DIAGNOSIS — M51.34 OTHER INTERVERTEBRAL DISC DEGENERATION, THORACIC REGION: ICD-10-CM

## 2024-05-23 DIAGNOSIS — M25.78 OTHER INTERVERTEBRAL DISC DEGENERATION, THORACIC REGION: ICD-10-CM

## 2024-05-23 PROCEDURE — 99203 OFFICE O/P NEW LOW 30 MIN: CPT

## 2024-05-23 NOTE — HISTORY OF PRESENT ILLNESS
[Mid-back] : mid-back [5] : 5 [Dull/Aching] : dull/aching [Radiating] : radiating [Retired] : Work status: retired [de-identified] : 5/23/24: 87 yo female with mid-upper back pain since April 2024. Denies specific injury. She states she has had intermittent symptoms for years and hs been traated with PT in the past. There is pain into her right shoulder blade and down her back. Denies numbness/tingling. She saw Dr. Bravo recently and took MDP. [] : Post Surgical Visit: no [FreeTextEntry5] : Patient complains of back pain for the past 1 months, state she had similar pain in the past intermittently. no specific injury.

## 2024-05-23 NOTE — PHYSICAL EXAM
[] : motor exam is 5/5 throughout both lower extremities with normal tone [FreeTextEntry8] : medial scap border tenderness

## 2024-05-23 NOTE — ASSESSMENT
[FreeTextEntry1] : Reviewed prior chart notes. Recommend resuming HEP, patient declines for PT rx. Tylenol prn, unable to take NSAIDs. Heating pad if needed. Follow up with Dr. Acuña as scheduled.

## 2024-06-12 NOTE — PATIENT PROFILE ADULT - HOW PATIENT ADDRESSED, PROFILE
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 Per outpatient pulm, PET scan negative  Continue following with Heme/Onc and Pulm as an outpatient    yue

## 2024-06-21 ENCOUNTER — APPOINTMENT (OUTPATIENT)
Dept: ORTHOPEDIC SURGERY | Facility: CLINIC | Age: 89
End: 2024-06-21

## 2024-07-01 ENCOUNTER — APPOINTMENT (OUTPATIENT)
Dept: ORTHOPEDIC SURGERY | Facility: CLINIC | Age: 89
End: 2024-07-01
Payer: MEDICARE

## 2024-07-01 VITALS — HEIGHT: 68 IN | BODY MASS INDEX: 27.13 KG/M2 | WEIGHT: 179 LBS

## 2024-07-01 DIAGNOSIS — M54.14 RADICULOPATHY, THORACIC REGION: ICD-10-CM

## 2024-07-01 DIAGNOSIS — M47.812 SPONDYLOSIS W/OUT MYELOPATHY OR RADICULOPATHY, CERVICAL REGION: ICD-10-CM

## 2024-07-01 PROCEDURE — 99213 OFFICE O/P EST LOW 20 MIN: CPT

## 2024-07-17 ENCOUNTER — RX RENEWAL (OUTPATIENT)
Age: 89
End: 2024-07-17

## 2024-08-02 ENCOUNTER — APPOINTMENT (OUTPATIENT)
Dept: INTERNAL MEDICINE | Facility: CLINIC | Age: 89
End: 2024-08-02

## 2024-08-02 VITALS
BODY MASS INDEX: 28.19 KG/M2 | WEIGHT: 186 LBS | OXYGEN SATURATION: 96 % | DIASTOLIC BLOOD PRESSURE: 83 MMHG | HEIGHT: 68 IN | SYSTOLIC BLOOD PRESSURE: 133 MMHG | TEMPERATURE: 98.1 F | HEART RATE: 86 BPM | RESPIRATION RATE: 12 BRPM

## 2024-08-02 DIAGNOSIS — E78.1 PURE HYPERGLYCERIDEMIA: ICD-10-CM

## 2024-08-02 DIAGNOSIS — E03.9 HYPOTHYROIDISM, UNSPECIFIED: ICD-10-CM

## 2024-08-02 DIAGNOSIS — I48.91 UNSPECIFIED ATRIAL FIBRILLATION: ICD-10-CM

## 2024-08-02 DIAGNOSIS — I10 ESSENTIAL (PRIMARY) HYPERTENSION: ICD-10-CM

## 2024-08-02 PROCEDURE — 99214 OFFICE O/P EST MOD 30 MIN: CPT

## 2024-08-02 PROCEDURE — G2211 COMPLEX E/M VISIT ADD ON: CPT

## 2024-08-02 PROCEDURE — 36415 COLL VENOUS BLD VENIPUNCTURE: CPT

## 2024-08-02 NOTE — REVIEW OF SYSTEMS
[Fever] : no fever [Chills] : no chills [Fatigue] : no fatigue [Night Sweats] : no night sweats [Vision Problems] : no vision problems [Chest Pain] : no chest pain [Palpitations] : no palpitations [Shortness Of Breath] : no shortness of breath [Abdominal Pain] : no abdominal pain [Constipation] : no constipation [Diarrhea] : diarrhea [Vomiting] : no vomiting [Joint Pain] : no joint pain [Headache] : no headache [Dizziness] : no dizziness

## 2024-08-02 NOTE — PHYSICAL EXAM
[No Acute Distress] : no acute distress [Well Nourished] : well nourished [Well Developed] : well developed [Well-Appearing] : well-appearing [Normal Sclera/Conjunctiva] : normal sclera/conjunctiva [Thyroid Normal, No Nodules] : the thyroid was normal and there were no nodules present [No Respiratory Distress] : no respiratory distress  [No Accessory Muscle Use] : no accessory muscle use [Normal S1, S2] : normal S1 and S2 [No Abdominal Bruit] : a ~M bruit was not heard ~T in the abdomen [No Edema] : there was no peripheral edema [Soft] : abdomen soft [Non Tender] : non-tender [Non-distended] : non-distended [Normal Bowel Sounds] : normal bowel sounds [de-identified] : Surgical site appears clean, dry, and intact

## 2024-08-02 NOTE — HISTORY OF PRESENT ILLNESS
[FreeTextEntry1] : f/u  [de-identified] : Nadeen is here today for follow-up.  She is here with her daughter.  At last visit, she had been started on Aricept which she has continued.  She was also prescribed mupirocin for a facial scabbed lesion and told to follow-up with them if it did not improve.  After did not improve, she went to dermatology and lesion was biopsied.  It was found to be cancerous and she recent underwent excision.  She had some mild abdominal discomfort this morning, though only had coffee with milk early.  She is moving her bowels regularly.  She is still passing gas.  No vomiting.

## 2024-08-02 NOTE — ASSESSMENT
[FreeTextEntry1] : 1.  Memory change/mood lability Aricpet 5mg  Has follow-up with neurology pending  2.  Hypothyroidism Repeat TFTs at goal Will request last thyroid ultrasound from Endo  3.  Hypertension, A-fib Blood pressure is well controlled Continue metoprolol, triamterene-HCTZ Continue Eliquis  4.  Recent skin cancer Followed by dermatology  5. Abdominal discomfort Reassuring abdominal exam [place] foods for next 1-2 days Return precautions discussed Monitor symptoms and call for change/worsening/lack of improvement    Follow-up for CPE

## 2024-08-04 LAB
ALBUMIN SERPL ELPH-MCNC: 4.5 G/DL
ALP BLD-CCNC: 69 U/L
ALT SERPL-CCNC: 13 U/L
ANION GAP SERPL CALC-SCNC: 15 MMOL/L
APPEARANCE: CLEAR
AST SERPL-CCNC: 22 U/L
BACTERIA: ABNORMAL /HPF
BASOPHILS # BLD AUTO: 0.1 K/UL
BASOPHILS NFR BLD AUTO: 1.1 %
BILIRUB SERPL-MCNC: 0.5 MG/DL
BILIRUBIN URINE: NEGATIVE
BLOOD URINE: NEGATIVE
BUN SERPL-MCNC: 30 MG/DL
CALCIUM SERPL-MCNC: 9.7 MG/DL
CAST: 0 /LPF
CHLORIDE SERPL-SCNC: 99 MMOL/L
CO2 SERPL-SCNC: 26 MMOL/L
COLOR: YELLOW
CREAT SERPL-MCNC: 1 MG/DL
EGFR: 55 ML/MIN/1.73M2
EOSINOPHIL # BLD AUTO: 0.62 K/UL
EOSINOPHIL NFR BLD AUTO: 6.9 %
EPITHELIAL CELLS: 2 /HPF
ESTIMATED AVERAGE GLUCOSE: 111 MG/DL
GLUCOSE QUALITATIVE U: NEGATIVE MG/DL
GLUCOSE SERPL-MCNC: 118 MG/DL
HBA1C MFR BLD HPLC: 5.5 %
HCT VFR BLD CALC: 45.2 %
HGB BLD-MCNC: 13.8 G/DL
IMM GRANULOCYTES NFR BLD AUTO: 1.5 %
KETONES URINE: ABNORMAL MG/DL
LEUKOCYTE ESTERASE URINE: ABNORMAL
LYMPHOCYTES # BLD AUTO: 1.25 K/UL
LYMPHOCYTES NFR BLD AUTO: 14 %
MAN DIFF?: NORMAL
MCHC RBC-ENTMCNC: 26.6 PG
MCHC RBC-ENTMCNC: 30.5 GM/DL
MCV RBC AUTO: 87.3 FL
MICROSCOPIC-UA: NORMAL
MONOCYTES # BLD AUTO: 0.54 K/UL
MONOCYTES NFR BLD AUTO: 6 %
NEUTROPHILS # BLD AUTO: 6.31 K/UL
NEUTROPHILS NFR BLD AUTO: 70.5 %
NITRITE URINE: NEGATIVE
PH URINE: 6.5
PLATELET # BLD AUTO: 282 K/UL
POTASSIUM SERPL-SCNC: 4.6 MMOL/L
PROT SERPL-MCNC: 6.7 G/DL
PROTEIN URINE: NEGATIVE MG/DL
RBC # BLD: 5.18 M/UL
RBC # FLD: 18.1 %
RED BLOOD CELLS URINE: 2 /HPF
SODIUM SERPL-SCNC: 141 MMOL/L
SPECIFIC GRAVITY URINE: 1.03
T4 FREE SERPL-MCNC: 1.2 NG/DL
TSH SERPL-ACNC: 1.82 UIU/ML
UROBILINOGEN URINE: 0.2 MG/DL
WBC # FLD AUTO: 8.95 K/UL
WHITE BLOOD CELLS URINE: 6 /HPF

## 2024-08-05 LAB
CHOLEST SERPL-MCNC: 163 MG/DL
HDLC SERPL-MCNC: 41 MG/DL
LDLC SERPL CALC-MCNC: 63 MG/DL
NONHDLC SERPL-MCNC: 123 MG/DL
TRIGL SERPL-MCNC: 385 MG/DL

## 2024-09-10 DIAGNOSIS — I10 ESSENTIAL (PRIMARY) HYPERTENSION: ICD-10-CM

## 2024-09-10 DIAGNOSIS — E03.9 HYPOTHYROIDISM, UNSPECIFIED: ICD-10-CM

## 2024-09-10 DIAGNOSIS — E78.1 PURE HYPERGLYCERIDEMIA: ICD-10-CM

## 2024-09-10 DIAGNOSIS — I48.91 UNSPECIFIED ATRIAL FIBRILLATION: ICD-10-CM

## 2024-09-11 LAB
25(OH)D3 SERPL-MCNC: 45.5 NG/ML
ALBUMIN SERPL ELPH-MCNC: 4.6 G/DL
ALP BLD-CCNC: 67 U/L
ALT SERPL-CCNC: 19 U/L
ANION GAP SERPL CALC-SCNC: 13 MMOL/L
APPEARANCE: CLEAR
AST SERPL-CCNC: 21 U/L
BACTERIA: NEGATIVE /HPF
BASOPHILS # BLD AUTO: 0.1 K/UL
BASOPHILS NFR BLD AUTO: 1.6 %
BILIRUB SERPL-MCNC: 1 MG/DL
BILIRUBIN URINE: NEGATIVE
BLOOD URINE: NEGATIVE
BUN SERPL-MCNC: 14 MG/DL
CALCIUM OXALATE CRYSTALS: PRESENT
CALCIUM SERPL-MCNC: 9.8 MG/DL
CAST: 0 /LPF
CHLORIDE SERPL-SCNC: 104 MMOL/L
CHOLEST SERPL-MCNC: 129 MG/DL
CO2 SERPL-SCNC: 27 MMOL/L
COLOR: NORMAL
CREAT SERPL-MCNC: 0.97 MG/DL
EGFR: 56 ML/MIN/1.73M2
EOSINOPHIL # BLD AUTO: 0.3 K/UL
EOSINOPHIL NFR BLD AUTO: 4.7 %
EPITHELIAL CELLS: 1 /HPF
ESTIMATED AVERAGE GLUCOSE: 111 MG/DL
GLUCOSE QUALITATIVE U: NEGATIVE MG/DL
GLUCOSE SERPL-MCNC: 106 MG/DL
HBA1C MFR BLD HPLC: 5.5 %
HCT VFR BLD CALC: 47.1 %
HDLC SERPL-MCNC: 43 MG/DL
HGB BLD-MCNC: 13.4 G/DL
IMM GRANULOCYTES NFR BLD AUTO: 0.5 %
KETONES URINE: ABNORMAL MG/DL
LDLC SERPL CALC-MCNC: 61 MG/DL
LEUKOCYTE ESTERASE URINE: NEGATIVE
LYMPHOCYTES # BLD AUTO: 1.03 K/UL
LYMPHOCYTES NFR BLD AUTO: 16.1 %
MAN DIFF?: NORMAL
MCHC RBC-ENTMCNC: 25.4 PG
MCHC RBC-ENTMCNC: 28.5 GM/DL
MCV RBC AUTO: 89.4 FL
MICROSCOPIC-UA: NORMAL
MONOCYTES # BLD AUTO: 0.52 K/UL
MONOCYTES NFR BLD AUTO: 8.1 %
MUCUS: PRESENT
NEUTROPHILS # BLD AUTO: 4.41 K/UL
NEUTROPHILS NFR BLD AUTO: 69 %
NITRITE URINE: NEGATIVE
NONHDLC SERPL-MCNC: 86 MG/DL
PH URINE: 5.5
PLATELET # BLD AUTO: 270 K/UL
POTASSIUM SERPL-SCNC: 4.4 MMOL/L
PROT SERPL-MCNC: 6.8 G/DL
PROTEIN URINE: 30 MG/DL
RBC # BLD: 5.27 M/UL
RBC # FLD: 18.1 %
RED BLOOD CELLS URINE: NORMAL /HPF
REVIEW: NORMAL
SODIUM SERPL-SCNC: 144 MMOL/L
SPECIFIC GRAVITY URINE: >1.03
T4 FREE SERPL-MCNC: 1.6 NG/DL
TRIGL SERPL-MCNC: 149 MG/DL
TSH SERPL-ACNC: 2.37 UIU/ML
UROBILINOGEN URINE: 0.2 MG/DL
WBC # FLD AUTO: 6.39 K/UL
WHITE BLOOD CELLS URINE: 1 /HPF

## 2024-09-12 ENCOUNTER — APPOINTMENT (OUTPATIENT)
Dept: INTERNAL MEDICINE | Facility: CLINIC | Age: 89
End: 2024-09-12

## 2024-10-28 ENCOUNTER — APPOINTMENT (OUTPATIENT)
Dept: INTERNAL MEDICINE | Facility: CLINIC | Age: 89
End: 2024-10-28
Payer: MEDICARE

## 2024-10-28 ENCOUNTER — NON-APPOINTMENT (OUTPATIENT)
Age: 89
End: 2024-10-28

## 2024-10-28 ENCOUNTER — RESULT REVIEW (OUTPATIENT)
Age: 89
End: 2024-10-28

## 2024-10-28 VITALS
BODY MASS INDEX: 29.55 KG/M2 | HEIGHT: 68 IN | WEIGHT: 195 LBS | DIASTOLIC BLOOD PRESSURE: 108 MMHG | HEART RATE: 108 BPM | RESPIRATION RATE: 12 BRPM | OXYGEN SATURATION: 97 % | SYSTOLIC BLOOD PRESSURE: 155 MMHG

## 2024-10-28 VITALS — DIASTOLIC BLOOD PRESSURE: 96 MMHG | SYSTOLIC BLOOD PRESSURE: 152 MMHG

## 2024-10-28 DIAGNOSIS — Z00.00 ENCOUNTER FOR GENERAL ADULT MEDICAL EXAMINATION W/OUT ABNORMAL FINDINGS: ICD-10-CM

## 2024-10-28 DIAGNOSIS — R41.82 ALTERED MENTAL STATUS, UNSPECIFIED: ICD-10-CM

## 2024-10-28 DIAGNOSIS — E03.9 HYPOTHYROIDISM, UNSPECIFIED: ICD-10-CM

## 2024-10-28 DIAGNOSIS — I48.91 UNSPECIFIED ATRIAL FIBRILLATION: ICD-10-CM

## 2024-10-28 DIAGNOSIS — I10 ESSENTIAL (PRIMARY) HYPERTENSION: ICD-10-CM

## 2024-10-28 DIAGNOSIS — R41.3 OTHER AMNESIA: ICD-10-CM

## 2024-10-28 DIAGNOSIS — E78.1 PURE HYPERGLYCERIDEMIA: ICD-10-CM

## 2024-10-28 PROCEDURE — 81003 URINALYSIS AUTO W/O SCOPE: CPT | Mod: QW

## 2024-10-28 PROCEDURE — 36415 COLL VENOUS BLD VENIPUNCTURE: CPT

## 2024-10-28 PROCEDURE — 99214 OFFICE O/P EST MOD 30 MIN: CPT | Mod: 25

## 2024-10-28 PROCEDURE — G0439: CPT

## 2024-10-28 PROCEDURE — 93000 ELECTROCARDIOGRAM COMPLETE: CPT | Mod: 59

## 2024-10-30 LAB
25(OH)D3 SERPL-MCNC: 32.5 NG/ML
ALBUMIN SERPL ELPH-MCNC: 4.3 G/DL
ALP BLD-CCNC: 73 U/L
ALT SERPL-CCNC: 14 U/L
ANION GAP SERPL CALC-SCNC: 15 MMOL/L
APPEARANCE: CLEAR
AST SERPL-CCNC: 23 U/L
BACTERIA: NEGATIVE /HPF
BASOPHILS # BLD AUTO: 0.09 K/UL
BASOPHILS NFR BLD AUTO: 1.3 %
BILIRUB SERPL-MCNC: 0.6 MG/DL
BILIRUBIN URINE: NEGATIVE
BLOOD URINE: NEGATIVE
BUN SERPL-MCNC: 19 MG/DL
CALCIUM SERPL-MCNC: 9.5 MG/DL
CAST: 0 /LPF
CHLORIDE SERPL-SCNC: 104 MMOL/L
CHOLEST SERPL-MCNC: 172 MG/DL
CO2 SERPL-SCNC: 23 MMOL/L
COLOR: YELLOW
CREAT SERPL-MCNC: 1.01 MG/DL
EGFR: 54 ML/MIN/1.73M2
EOSINOPHIL # BLD AUTO: 0.54 K/UL
EOSINOPHIL NFR BLD AUTO: 8 %
EPITHELIAL CELLS: 0 /HPF
ESTIMATED AVERAGE GLUCOSE: 117 MG/DL
FOLATE SERPL-MCNC: 10.1 NG/ML
GLUCOSE QUALITATIVE U: NEGATIVE MG/DL
GLUCOSE SERPL-MCNC: 88 MG/DL
HBA1C MFR BLD HPLC: 5.7 %
HCT VFR BLD CALC: 46.2 %
HDLC SERPL-MCNC: 52 MG/DL
HGB BLD-MCNC: 13.6 G/DL
IMM GRANULOCYTES NFR BLD AUTO: 0.4 %
KETONES URINE: NEGATIVE MG/DL
LDLC SERPL CALC-MCNC: 82 MG/DL
LEUKOCYTE ESTERASE URINE: NEGATIVE
LYMPHOCYTES # BLD AUTO: 1.15 K/UL
LYMPHOCYTES NFR BLD AUTO: 17 %
MAN DIFF?: NORMAL
MCHC RBC-ENTMCNC: 25 PG
MCHC RBC-ENTMCNC: 29.4 GM/DL
MCV RBC AUTO: 84.9 FL
MICROSCOPIC-UA: NORMAL
MONOCYTES # BLD AUTO: 0.5 K/UL
MONOCYTES NFR BLD AUTO: 7.4 %
NEUTROPHILS # BLD AUTO: 4.44 K/UL
NEUTROPHILS NFR BLD AUTO: 65.9 %
NITRITE URINE: NEGATIVE
NONHDLC SERPL-MCNC: 120 MG/DL
PH URINE: 5.5
PLATELET # BLD AUTO: 253 K/UL
POTASSIUM SERPL-SCNC: 4.8 MMOL/L
PROT SERPL-MCNC: 6.7 G/DL
PROTEIN URINE: NEGATIVE MG/DL
RBC # BLD: 5.44 M/UL
RBC # FLD: 18.1 %
RED BLOOD CELLS URINE: 0 /HPF
SODIUM SERPL-SCNC: 141 MMOL/L
SPECIFIC GRAVITY URINE: 1.02
T4 FREE SERPL-MCNC: 1.2 NG/DL
TRIGL SERPL-MCNC: 230 MG/DL
TSH SERPL-ACNC: 2.28 UIU/ML
UROBILINOGEN URINE: 0.2 MG/DL
VIT B12 SERPL-MCNC: 301 PG/ML
WBC # FLD AUTO: 6.75 K/UL
WHITE BLOOD CELLS URINE: 0 /HPF

## 2024-10-31 ENCOUNTER — LABORATORY RESULT (OUTPATIENT)
Age: 89
End: 2024-10-31

## 2024-10-31 LAB — BACTERIA UR CULT: NORMAL

## 2024-11-05 ENCOUNTER — APPOINTMENT (OUTPATIENT)
Dept: MRI IMAGING | Facility: CLINIC | Age: 89
End: 2024-11-05
Payer: MEDICARE

## 2024-11-05 ENCOUNTER — APPOINTMENT (OUTPATIENT)
Dept: RADIOLOGY | Facility: CLINIC | Age: 89
End: 2024-11-05
Payer: MEDICARE

## 2024-11-05 PROCEDURE — 77085 DXA BONE DENSITY AXL VRT FX: CPT

## 2024-11-05 PROCEDURE — 0866T QUAN MRI ALYS BRN W/DX MRI: CPT

## 2024-11-05 PROCEDURE — 70551 MRI BRAIN STEM W/O DYE: CPT

## 2024-12-04 ENCOUNTER — APPOINTMENT (OUTPATIENT)
Dept: ORTHOPEDIC SURGERY | Facility: CLINIC | Age: 89
End: 2024-12-04
Payer: MEDICARE

## 2024-12-04 DIAGNOSIS — M54.14 RADICULOPATHY, THORACIC REGION: ICD-10-CM

## 2024-12-04 PROCEDURE — 99213 OFFICE O/P EST LOW 20 MIN: CPT

## 2025-01-15 ENCOUNTER — APPOINTMENT (OUTPATIENT)
Dept: ORTHOPEDIC SURGERY | Facility: CLINIC | Age: 89
End: 2025-01-15

## 2025-01-21 ENCOUNTER — NON-APPOINTMENT (OUTPATIENT)
Age: 89
End: 2025-01-21

## 2025-02-03 ENCOUNTER — APPOINTMENT (OUTPATIENT)
Dept: INTERNAL MEDICINE | Facility: CLINIC | Age: 89
End: 2025-02-03
Payer: MEDICARE

## 2025-02-03 ENCOUNTER — LABORATORY RESULT (OUTPATIENT)
Age: 89
End: 2025-02-03

## 2025-02-03 VITALS
OXYGEN SATURATION: 98 % | BODY MASS INDEX: 27.89 KG/M2 | SYSTOLIC BLOOD PRESSURE: 111 MMHG | HEIGHT: 68 IN | HEART RATE: 83 BPM | RESPIRATION RATE: 12 BRPM | TEMPERATURE: 97.4 F | DIASTOLIC BLOOD PRESSURE: 71 MMHG | WEIGHT: 184 LBS

## 2025-02-03 DIAGNOSIS — R41.89 OTHER SYMPTOMS AND SIGNS INVOLVING COGNITIVE FUNCTIONS AND AWARENESS: ICD-10-CM

## 2025-02-03 DIAGNOSIS — I10 ESSENTIAL (PRIMARY) HYPERTENSION: ICD-10-CM

## 2025-02-03 DIAGNOSIS — N39.0 URINARY TRACT INFECTION, SITE NOT SPECIFIED: ICD-10-CM

## 2025-02-03 DIAGNOSIS — I48.91 UNSPECIFIED ATRIAL FIBRILLATION: ICD-10-CM

## 2025-02-03 DIAGNOSIS — E03.9 HYPOTHYROIDISM, UNSPECIFIED: ICD-10-CM

## 2025-02-03 PROCEDURE — G2211 COMPLEX E/M VISIT ADD ON: CPT

## 2025-02-03 PROCEDURE — 36415 COLL VENOUS BLD VENIPUNCTURE: CPT

## 2025-02-03 PROCEDURE — 99213 OFFICE O/P EST LOW 20 MIN: CPT

## 2025-02-03 RX ORDER — ATORVASTATIN CALCIUM 40 MG/1
40 TABLET, FILM COATED ORAL
Qty: 30 | Refills: 0 | Status: ACTIVE | COMMUNITY
Start: 2025-01-18

## 2025-02-05 LAB
ALBUMIN SERPL ELPH-MCNC: 4.3 G/DL
ALP BLD-CCNC: 62 U/L
ALT SERPL-CCNC: 20 U/L
ANION GAP SERPL CALC-SCNC: 14 MMOL/L
APPEARANCE: CLEAR
AST SERPL-CCNC: 20 U/L
BACTERIA UR CULT: NORMAL
BACTERIA: NEGATIVE /HPF
BASOPHILS # BLD AUTO: 0.1 K/UL
BASOPHILS NFR BLD AUTO: 1.2 %
BILIRUB SERPL-MCNC: 0.6 MG/DL
BILIRUBIN URINE: NEGATIVE
BLOOD URINE: NEGATIVE
BUN SERPL-MCNC: 23 MG/DL
CALCIUM OXALATE CRYSTALS: PRESENT
CALCIUM SERPL-MCNC: 9.8 MG/DL
CAST: NORMAL /LPF
CHLORIDE SERPL-SCNC: 104 MMOL/L
CO2 SERPL-SCNC: 25 MMOL/L
COLOR: NORMAL
CREAT SERPL-MCNC: 1.06 MG/DL
EGFR: 51 ML/MIN/1.73M2
EOSINOPHIL # BLD AUTO: 0.24 K/UL
EOSINOPHIL NFR BLD AUTO: 3 %
EPITHELIAL CELLS: 1 /HPF
GLUCOSE QUALITATIVE U: NEGATIVE MG/DL
GLUCOSE SERPL-MCNC: 97 MG/DL
HCT VFR BLD CALC: 44.8 %
HGB BLD-MCNC: 13.6 G/DL
IMM GRANULOCYTES NFR BLD AUTO: 0.7 %
KETONES URINE: NEGATIVE MG/DL
LEUKOCYTE ESTERASE URINE: NEGATIVE
LYMPHOCYTES # BLD AUTO: 1.28 K/UL
LYMPHOCYTES NFR BLD AUTO: 15.7 %
MAN DIFF?: NORMAL
MCHC RBC-ENTMCNC: 26 PG
MCHC RBC-ENTMCNC: 30.4 G/DL
MCV RBC AUTO: 85.5 FL
MICROSCOPIC-UA: NORMAL
MONOCYTES # BLD AUTO: 0.5 K/UL
MONOCYTES NFR BLD AUTO: 6.2 %
NEUTROPHILS # BLD AUTO: 5.95 K/UL
NEUTROPHILS NFR BLD AUTO: 73.2 %
NITRITE URINE: NEGATIVE
PH URINE: 6
PLATELET # BLD AUTO: 399 K/UL
POTASSIUM SERPL-SCNC: 4.7 MMOL/L
PROT SERPL-MCNC: 6.6 G/DL
PROTEIN URINE: NEGATIVE MG/DL
RBC # BLD: 5.24 M/UL
RBC # FLD: 20.3 %
RED BLOOD CELLS URINE: NORMAL /HPF
REVIEW: NORMAL
SODIUM SERPL-SCNC: 143 MMOL/L
SPECIFIC GRAVITY URINE: 1.02
T4 FREE SERPL-MCNC: 1.4 NG/DL
TSH SERPL-ACNC: 1.26 UIU/ML
UROBILINOGEN URINE: 0.2 MG/DL
WBC # FLD AUTO: 8.13 K/UL
WHITE BLOOD CELLS URINE: 1 /HPF

## 2025-05-09 ENCOUNTER — APPOINTMENT (OUTPATIENT)
Dept: ORTHOPEDIC SURGERY | Facility: CLINIC | Age: 89
End: 2025-05-09
Payer: MEDICARE

## 2025-05-09 DIAGNOSIS — M54.14 RADICULOPATHY, THORACIC REGION: ICD-10-CM

## 2025-05-09 DIAGNOSIS — M47.812 SPONDYLOSIS W/OUT MYELOPATHY OR RADICULOPATHY, CERVICAL REGION: ICD-10-CM

## 2025-05-09 PROCEDURE — 99213 OFFICE O/P EST LOW 20 MIN: CPT

## 2025-05-27 NOTE — ED STATDOCS - ATTENDING CONTRIBUTION TO CARE
normal behavior I, Sofía Wu, performed the initial face to face bedside interview with this patient regarding history of present illness, review of symptoms and relevant past medical, social and family history.  I completed an independent physical examination.  I was the initial provider who evaluated this patient. I have signed out the follow up of any pending tests (i.e. labs, radiological studies) to the ACP.  I have communicated the patient’s plan of care and disposition with the ACP.  The history, relevant review of systems, past medical and surgical history, medical decision making, and physical examination was documented by the scribe in my presence and I attest to the accuracy of the documentation.

## 2025-07-29 ENCOUNTER — RX RENEWAL (OUTPATIENT)
Age: 89
End: 2025-07-29

## 2025-09-18 ENCOUNTER — APPOINTMENT (OUTPATIENT)
Age: 89
End: 2025-09-18
Payer: MEDICARE

## 2025-09-18 VITALS
WEIGHT: 185 LBS | DIASTOLIC BLOOD PRESSURE: 88 MMHG | BODY MASS INDEX: 28.04 KG/M2 | OXYGEN SATURATION: 98 % | HEIGHT: 68 IN | HEART RATE: 95 BPM | SYSTOLIC BLOOD PRESSURE: 142 MMHG

## 2025-09-18 VITALS — DIASTOLIC BLOOD PRESSURE: 78 MMHG | SYSTOLIC BLOOD PRESSURE: 126 MMHG

## 2025-09-18 DIAGNOSIS — I48.91 UNSPECIFIED ATRIAL FIBRILLATION: ICD-10-CM

## 2025-09-18 DIAGNOSIS — E78.1 PURE HYPERGLYCERIDEMIA: ICD-10-CM

## 2025-09-18 DIAGNOSIS — Z23 ENCOUNTER FOR IMMUNIZATION: ICD-10-CM

## 2025-09-18 DIAGNOSIS — I10 ESSENTIAL (PRIMARY) HYPERTENSION: ICD-10-CM

## 2025-09-18 DIAGNOSIS — E03.9 HYPOTHYROIDISM, UNSPECIFIED: ICD-10-CM

## 2025-09-18 LAB
ALBUMIN SERPL ELPH-MCNC: 4.3 G/DL
ALP BLD-CCNC: 69 U/L
ALT SERPL-CCNC: 20 U/L
ANION GAP SERPL CALC-SCNC: 14 MMOL/L
AST SERPL-CCNC: 25 U/L
BASOPHILS # BLD AUTO: 0.07 K/UL
BASOPHILS NFR BLD AUTO: 1.1 %
BILIRUB SERPL-MCNC: 0.8 MG/DL
BUN SERPL-MCNC: 15 MG/DL
CALCIUM SERPL-MCNC: 9.3 MG/DL
CHLORIDE SERPL-SCNC: 106 MMOL/L
CHOLEST SERPL-MCNC: 128 MG/DL
CO2 SERPL-SCNC: 21 MMOL/L
CREAT SERPL-MCNC: 0.92 MG/DL
EGFRCR SERPLBLD CKD-EPI 2021: 60 ML/MIN/1.73M2
EOSINOPHIL # BLD AUTO: 0.29 K/UL
EOSINOPHIL NFR BLD AUTO: 4.7 %
GLUCOSE SERPL-MCNC: 92 MG/DL
HCT VFR BLD CALC: 42.2 %
HDLC SERPL-MCNC: 38 MG/DL
HGB BLD-MCNC: 12.6 G/DL
IMM GRANULOCYTES NFR BLD AUTO: 0.3 %
LDLC SERPL-MCNC: 56 MG/DL
LYMPHOCYTES # BLD AUTO: 0.93 K/UL
LYMPHOCYTES NFR BLD AUTO: 15 %
MAN DIFF?: NORMAL
MCHC RBC-ENTMCNC: 25.4 PG
MCHC RBC-ENTMCNC: 29.9 G/DL
MCV RBC AUTO: 84.9 FL
MONOCYTES # BLD AUTO: 0.4 K/UL
MONOCYTES NFR BLD AUTO: 6.4 %
NEUTROPHILS # BLD AUTO: 4.5 K/UL
NEUTROPHILS NFR BLD AUTO: 72.5 %
NONHDLC SERPL-MCNC: 89 MG/DL
PLATELET # BLD AUTO: 224 K/UL
POTASSIUM SERPL-SCNC: 4.6 MMOL/L
PROT SERPL-MCNC: 6.4 G/DL
RBC # BLD: 4.97 M/UL
RBC # FLD: 18.3 %
SODIUM SERPL-SCNC: 142 MMOL/L
T3FREE SERPL-MCNC: 2.72 PG/ML
T4 FREE SERPL-MCNC: 1.2 NG/DL
THYROGLOB AB SERPL-ACNC: 20.9 IU/ML
THYROPEROXIDASE AB SERPL IA-ACNC: 14.6 IU/ML
TRIGL SERPL-MCNC: 206 MG/DL
TSH SERPL-ACNC: 1.85 UIU/ML
WBC # FLD AUTO: 6.21 K/UL

## 2025-09-18 PROCEDURE — 36415 COLL VENOUS BLD VENIPUNCTURE: CPT

## 2025-09-18 PROCEDURE — G2211 COMPLEX E/M VISIT ADD ON: CPT

## 2025-09-18 PROCEDURE — 90662 IIV NO PRSV INCREASED AG IM: CPT

## 2025-09-18 PROCEDURE — 99214 OFFICE O/P EST MOD 30 MIN: CPT

## 2025-09-18 PROCEDURE — G0008: CPT

## 2025-09-18 RX ORDER — LOSARTAN POTASSIUM 50 MG/1
50 TABLET, FILM COATED ORAL DAILY
Qty: 30 | Refills: 2 | Status: ACTIVE | COMMUNITY
Start: 2025-09-18

## 2025-09-19 LAB
APPEARANCE: CLEAR
BACTERIA: NEGATIVE /HPF
BILIRUBIN URINE: NEGATIVE
BLOOD URINE: NEGATIVE
CALCIUM OXALATE CRYSTALS: PRESENT
CAST: 0 /LPF
COLOR: YELLOW
EPITHELIAL CELLS: 1 /HPF
ESTIMATED AVERAGE GLUCOSE: 108 MG/DL
GLUCOSE QUALITATIVE U: NEGATIVE MG/DL
HBA1C MFR BLD HPLC: 5.4 %
KETONES URINE: ABNORMAL MG/DL
LEUKOCYTE ESTERASE URINE: NEGATIVE
MICROSCOPIC-UA: NORMAL
NITRITE URINE: NEGATIVE
PH URINE: 5
PROTEIN URINE: NEGATIVE MG/DL
RED BLOOD CELLS URINE: 1 /HPF
REVIEW: NORMAL
SPECIFIC GRAVITY URINE: 1.03
UROBILINOGEN URINE: 0.2 MG/DL
WHITE BLOOD CELLS URINE: 0 /HPF